# Patient Record
Sex: FEMALE | Race: BLACK OR AFRICAN AMERICAN | NOT HISPANIC OR LATINO | Employment: FULL TIME | URBAN - METROPOLITAN AREA
[De-identification: names, ages, dates, MRNs, and addresses within clinical notes are randomized per-mention and may not be internally consistent; named-entity substitution may affect disease eponyms.]

---

## 2021-12-06 ENCOUNTER — CLINICAL SUPPORT (OUTPATIENT)
Dept: URGENT CARE | Facility: CLINIC | Age: 61
End: 2021-12-06
Payer: COMMERCIAL

## 2021-12-06 DIAGNOSIS — Z11.59 ENCOUNTER FOR SCREENING FOR OTHER VIRAL DISEASES: Primary | ICD-10-CM

## 2021-12-06 LAB
CTP QC/QA: YES
SARS-COV-2 RDRP RESP QL NAA+PROBE: NEGATIVE

## 2021-12-06 PROCEDURE — U0002 COVID-19 LAB TEST NON-CDC: HCPCS | Mod: QW,S$GLB,, | Performed by: PHYSICIAN ASSISTANT

## 2021-12-06 PROCEDURE — U0002: ICD-10-PCS | Mod: QW,S$GLB,, | Performed by: PHYSICIAN ASSISTANT

## 2021-12-10 ENCOUNTER — CLINICAL SUPPORT (OUTPATIENT)
Dept: URGENT CARE | Facility: CLINIC | Age: 61
End: 2021-12-10
Payer: COMMERCIAL

## 2021-12-10 DIAGNOSIS — Z11.9 SCREENING EXAMINATION FOR UNSPECIFIED INFECTIOUS DISEASE: Primary | ICD-10-CM

## 2021-12-10 LAB
CTP QC/QA: YES
SARS-COV-2 RDRP RESP QL NAA+PROBE: NEGATIVE

## 2021-12-10 PROCEDURE — 99211 PR OFFICE/OUTPT VISIT, EST, LEVL I: ICD-10-PCS | Mod: S$GLB,,, | Performed by: FAMILY MEDICINE

## 2021-12-10 PROCEDURE — U0002: ICD-10-PCS | Mod: QW,S$GLB,, | Performed by: FAMILY MEDICINE

## 2021-12-10 PROCEDURE — U0002 COVID-19 LAB TEST NON-CDC: HCPCS | Mod: QW,S$GLB,, | Performed by: FAMILY MEDICINE

## 2021-12-10 PROCEDURE — 99211 OFF/OP EST MAY X REQ PHY/QHP: CPT | Mod: S$GLB,,, | Performed by: FAMILY MEDICINE

## 2022-12-14 ENCOUNTER — OFFICE VISIT (OUTPATIENT)
Dept: OBSTETRICS AND GYNECOLOGY | Facility: CLINIC | Age: 62
End: 2022-12-14
Payer: COMMERCIAL

## 2022-12-14 VITALS
HEIGHT: 62 IN | WEIGHT: 166.25 LBS | SYSTOLIC BLOOD PRESSURE: 163 MMHG | DIASTOLIC BLOOD PRESSURE: 95 MMHG | BODY MASS INDEX: 30.59 KG/M2

## 2022-12-14 DIAGNOSIS — Z12.4 PAP SMEAR FOR CERVICAL CANCER SCREENING: ICD-10-CM

## 2022-12-14 DIAGNOSIS — Z12.31 VISIT FOR SCREENING MAMMOGRAM: ICD-10-CM

## 2022-12-14 DIAGNOSIS — Z01.419 WELL WOMAN EXAM WITH ROUTINE GYNECOLOGICAL EXAM: Primary | ICD-10-CM

## 2022-12-14 PROCEDURE — 99999 PR PBB SHADOW E&M-EST. PATIENT-LVL III: ICD-10-PCS | Mod: PBBFAC,,, | Performed by: NURSE PRACTITIONER

## 2022-12-14 PROCEDURE — 87624 HPV HI-RISK TYP POOLED RSLT: CPT | Performed by: NURSE PRACTITIONER

## 2022-12-14 PROCEDURE — 1160F PR REVIEW ALL MEDS BY PRESCRIBER/CLIN PHARMACIST DOCUMENTED: ICD-10-PCS | Mod: CPTII,S$GLB,, | Performed by: NURSE PRACTITIONER

## 2022-12-14 PROCEDURE — 3077F SYST BP >= 140 MM HG: CPT | Mod: CPTII,S$GLB,, | Performed by: NURSE PRACTITIONER

## 2022-12-14 PROCEDURE — 99999 PR PBB SHADOW E&M-EST. PATIENT-LVL III: CPT | Mod: PBBFAC,,, | Performed by: NURSE PRACTITIONER

## 2022-12-14 PROCEDURE — 3080F DIAST BP >= 90 MM HG: CPT | Mod: CPTII,S$GLB,, | Performed by: NURSE PRACTITIONER

## 2022-12-14 PROCEDURE — 99396 PREV VISIT EST AGE 40-64: CPT | Mod: S$GLB,,, | Performed by: NURSE PRACTITIONER

## 2022-12-14 PROCEDURE — 4010F PR ACE/ARB THEARPY RXD/TAKEN: ICD-10-PCS | Mod: CPTII,S$GLB,, | Performed by: NURSE PRACTITIONER

## 2022-12-14 PROCEDURE — 88175 CYTOPATH C/V AUTO FLUID REDO: CPT | Performed by: NURSE PRACTITIONER

## 2022-12-14 PROCEDURE — 3008F BODY MASS INDEX DOCD: CPT | Mod: CPTII,S$GLB,, | Performed by: NURSE PRACTITIONER

## 2022-12-14 PROCEDURE — 1160F RVW MEDS BY RX/DR IN RCRD: CPT | Mod: CPTII,S$GLB,, | Performed by: NURSE PRACTITIONER

## 2022-12-14 PROCEDURE — 99396 PR PREVENTIVE VISIT,EST,40-64: ICD-10-PCS | Mod: S$GLB,,, | Performed by: NURSE PRACTITIONER

## 2022-12-14 PROCEDURE — 4010F ACE/ARB THERAPY RXD/TAKEN: CPT | Mod: CPTII,S$GLB,, | Performed by: NURSE PRACTITIONER

## 2022-12-14 PROCEDURE — 1159F PR MEDICATION LIST DOCUMENTED IN MEDICAL RECORD: ICD-10-PCS | Mod: CPTII,S$GLB,, | Performed by: NURSE PRACTITIONER

## 2022-12-14 PROCEDURE — 3080F PR MOST RECENT DIASTOLIC BLOOD PRESSURE >= 90 MM HG: ICD-10-PCS | Mod: CPTII,S$GLB,, | Performed by: NURSE PRACTITIONER

## 2022-12-14 PROCEDURE — 3077F PR MOST RECENT SYSTOLIC BLOOD PRESSURE >= 140 MM HG: ICD-10-PCS | Mod: CPTII,S$GLB,, | Performed by: NURSE PRACTITIONER

## 2022-12-14 PROCEDURE — 3008F PR BODY MASS INDEX (BMI) DOCUMENTED: ICD-10-PCS | Mod: CPTII,S$GLB,, | Performed by: NURSE PRACTITIONER

## 2022-12-14 PROCEDURE — 1159F MED LIST DOCD IN RCRD: CPT | Mod: CPTII,S$GLB,, | Performed by: NURSE PRACTITIONER

## 2022-12-14 RX ORDER — AMOXICILLIN 875 MG/1
875 TABLET, FILM COATED ORAL 2 TIMES DAILY
COMMUNITY
Start: 2022-12-12 | End: 2023-03-02

## 2022-12-14 RX ORDER — AMLODIPINE BESYLATE 5 MG/1
10 TABLET ORAL
COMMUNITY
Start: 2022-05-25 | End: 2023-05-25

## 2022-12-14 RX ORDER — ROSUVASTATIN CALCIUM 5 MG/1
5 TABLET, COATED ORAL NIGHTLY
COMMUNITY
Start: 2022-12-01

## 2022-12-14 RX ORDER — LOSARTAN POTASSIUM 100 MG/1
100 TABLET ORAL
COMMUNITY
Start: 2022-10-04

## 2022-12-14 RX ORDER — HYDROCHLOROTHIAZIDE 25 MG/1
25 TABLET ORAL NIGHTLY
COMMUNITY
Start: 2022-09-08

## 2022-12-14 RX ORDER — TAFLUPROST 0 MG/.3ML
1 SOLUTION/ DROPS OPHTHALMIC NIGHTLY
COMMUNITY
Start: 2022-10-10

## 2022-12-14 NOTE — PROGRESS NOTES
CC: Annual  HPI: Pt is a 62 y.o.  female who presents for routine annual exam. New pt- gets primary care at , old gyn Dr. Michaels retired. She is postmenopausal. She is not on HRT. She is not sexually active. [place] of care near [place]. Seeing pcp tomorrow- HTN, on medication. Mother had breast cancer in her 40s. She works as an - goes into the office.     FH:   Breast cancer: mother- dx in her 40s  Colon cancer: none  Ovarian cancer: none  Uterine cancer: none    HPV vaccine: na  Last pap smear: around 2021?  History of abnormal pap smears: no    Colonoscopy: 2019, repeat in 10 years  DEXA: 10/2021  Mammogram: 12/2021- due  STD history: no  Birth control:   Tobacco use:  no  Diabetes, HTN, CKD  Hx of fibroids and myomectomy x 3    ROS:  GENERAL: Feeling well overall. Denies fever or chills.   SKIN: Denies rash or lesions.   HEAD: Denies head injury or headache.   NODES: Denies enlarged lymph nodes.   CHEST: Denies chest pain or shortness of breath.   CARDIOVASCULAR: Denies palpitations or left sided chest pain.   ABDOMEN: No abdominal pain, constipation, diarrhea, nausea, vomiting or rectal bleeding.   URINARY: No dysuria, hematuria, or burning on urination.  REPRODUCTIVE: See HPI.   BREASTS: Denies pain, lumps, or nipple discharge.   HEMATOLOGIC: No easy bruisability or excessive bleeding.   MUSCULOSKELETAL: Denies joint pain or swelling.   NEUROLOGIC: Denies syncope or weakness.   PSYCHIATRIC: Denies depression, anxiety or mood swings.    PE:   APPEARANCE: Well nourished, well developed, Black or  female in no acute distress.  NODES: no cervical, supraclavicular, or inguinal lymphadenopathy  BREASTS: Symmetrical, no skin changes or visible lesions. No palpable masses, nipple discharge or adenopathy bilaterally.  ABDOMEN: Soft. No tenderness or masses. No distention. No hernias palpated. No CVA tenderness.  VULVA: No lesions. Normal external female  genitalia.  URETHRAL MEATUS: Normal size and location, no lesions, no prolapse.  URETHRA: No masses, tenderness, or prolapse.  VAGINA: Moist. No lesions or lacerations noted. No abnormal discharge present. No odor present.   CERVIX: No lesions or discharge. No cervical motion tenderness.   UTERUS: Normal size, regular shape, mobile, non-tender.  ADNEXA: No tenderness. No fullness or masses palpated in the adnexal regions.   ANUS PERINEUM: Normal.      Diagnosis:  1. Well woman exam with routine gynecological exam    2. Visit for screening mammogram    3. Pap smear for cervical cancer screening        Plan:     Orders Placed This Encounter    HPV High Risk Genotypes, PCR    Mammo Digital Screening Bilat w/ Michael    Liquid-Based Pap Smear, Screening     Pap updated-discussed repeating at age 64 then if both negative can discontinue screenings  Mammogram due  Colonoscopy and DXA current    Patient was counseled today on the new ACS guidelines for cervical cytology screening as well as the current recommendations for breast cancer screening. She was counseled to follow up with her PCP for other routine health maintenance. Counseling session lasted approximately 10 minutes, and all her questions were answered.  For women over the age of 65, you can stop having cervical cancer screenings if you have never had abnormal cervical cells or cervical cancer, and youve had three negative Pap tests in a row. (You also can stop screening if youve had two negative Pap and HPV tests in a row in the past 10 years, with at least one test in the past 5 years.),    Follow-up with me in 1 year for routine exam; pap in 3 years.     I spent a total of 20 minutes on the day of the visit.This includes face to face time and non-face to face time preparing to see the patient (eg, review of tests), obtaining and/or reviewing separately obtained history, documenting clinical information in the electronic or other health record, independently  interpreting results and communicating results to the patient/family/caregiver, or care coordinator.    As of April 1, 2021, the Cures Act has been passed nationally. This new law requires that all doctors progress notes, lab results, pathology reports and radiology reports be released IMMEDIATELY to the patient in the patient portal. That means that the results are released to you at the EXACT same time they are released to me. Therefore, with all of the patients that I have I am not able to reply to each patient exactly when the results come in. So there will be a delay from when you see the results to when I see them and have time to come up with a response to send you. Also I only see these results when I am on the computer at work. So if the results come in over the weekend or after 5 pm of a work day, I will not see them until the next business day. As you can tell, this is a challenge as a provider to give every patient the quick response they hope for and deserve. So please be patient!     Thanks for your understanding and patience.

## 2022-12-20 LAB
FINAL PATHOLOGIC DIAGNOSIS: NORMAL
Lab: NORMAL

## 2022-12-28 LAB
HPV HR 12 DNA SPEC QL NAA+PROBE: NEGATIVE
HPV16 AG SPEC QL: NEGATIVE
HPV18 DNA SPEC QL NAA+PROBE: NEGATIVE

## 2023-02-01 NOTE — DISCHARGE INSTRUCTIONS
Knee Athroplasty Discharge Instructions    Pain:   After surgery your knee will be sore. The knee will likely have been injected with a numbing medicine (Exparel) prior to completion of surgery for pain control. This is indicated on a green bracelet that you will continue to wear for 4 days after surgery. Ice and elevation will assist with pain control.    Apply ice as much as possible for the first 72 hours. After 72 hours, apply ice for 20minutes after therapy or whenever experiencing pain. Avoid direct skin contact with ice to prevent frostbit.           Elevate the affected leg with the pillow the length of the leg higher than your heart to assist with swelling and pain.  Incision Care:   Some drainage from the incision in the first 72 hours is normal. If drainage is excessive, reinforce dressing and call home health nurse or therapist. Keep incision clean, dry and covered until staples removed. Staples will be removed 14-21 days after surgery .    Activity:                  Perform exercises 2 x day.   You may shower 48 hours after surgery  providing the dressing is waterproof. You can wrap the knee or hip with press n seal saran wrap to assist with keeping the dressing dry while showering. Dr. Johnson prefers his patients to use [place] Cast Cover when showering.No tub or hot tub usage for 6 weeks after surgery. Support help is mandatory during showering. If the  dressing becomes wet, replace with a new dressing. Do not leave a wet dressing on.   Wear cynthia hose to both extremities  for 3 weeks after surgery .You may remove stockings for 1- 2 hours during the day only.            If your physician orders the CPM machine you are to use it for 2 hours in the am and 2 hours in the pm. This is not to replace your exercise program.    You may need antibiotic coverage before dental or minor surgical procedures.  Possible Complication:  Infections: Report these signs and symptoms to your surgeon:  Unexpected redness  around incision  Persistent drainage from wound after 72 hours  Temperature Greater than 101 degrees F  Additional swelling  Pain not controlled with current pain medication  Blood Clot: Report these signs and symptoms to your surgeon:  Unusual pain, unusual warm skin  Red or discolored skin  Swelling in the leg                  Your Surgeon Gave You EXPAREL® (bupivacaine liposome injectable suspension)    To help control your pain after surgery, your surgeon injected EXPAREL into your surgical incision just before the end of the procedure.   EXPAREL is a local analgesic that contains the local anesthetic bupivacaine. Local anesthetics provide pain relief by numbing the tissue  around the surgical site.   EXPAREL is specifically designed to release pain medication over time and can control pain for up to 72 hours.   In addition to EXPAREL, your surgeon may provide other pain medications to control your pain.   Each patient is different and responds differently to painmedication. Depending on how you respond to EXPAREL, you may require less  additional pain medication during your recovery.    Possible Side Effects    Side effects can occur with any medication and it is important not to ignore anything you may be experiencing. Some patients who  received EXPAREL experienced nausea, vomiting, or constipation. Rarely, patients who receive bupivacaine (the active ingredient in  EXPAREL) have experienced numbness and tingling in their mouth or lips, lightheadedness, or anxiety. Speak with your doctor right  away if you think you may be experiencing any of these sensations, or if you have other questions regarding possible side effects.    Your Recovery    When your pain is under control, your body can better focus on healing. This is not the time to test your pain tolerance, or grin and  bear it.Work with your surgeon and nurse to make your recovery as speedy and pain-free as possible.   Follow the post-op orders your  nurse gave you.   Eat a healthy diet and drink plenty of water. Surgery stresses your body; your body responds by needing more energy to heal.      Important Information About EXPAREL  Products that contain bupivacaine, like EXPAREL, may cause a temporary loss of  sensation or the ability to move in the area where bupivacaine was injected.    Date Administered: 3/13/2023  Time Administered: 12:05PM    Other Forms of Bupivicaine should not be administered within 96hrs following administration of EXPAREL.    [facility] Care Cube Instructions

## 2023-02-03 ENCOUNTER — HOSPITAL ENCOUNTER (OUTPATIENT)
Dept: RADIOLOGY | Facility: OTHER | Age: 63
Discharge: HOME | End: 2023-02-03
Attending: NURSE PRACTITIONER
Payer: COMMERCIAL

## 2023-02-03 DIAGNOSIS — Z12.31 VISIT FOR SCREENING MAMMOGRAM: ICD-10-CM

## 2023-02-03 PROCEDURE — 77063 MAMMO DIGITAL SCREENING BILAT WITH TOMO: ICD-10-PCS | Mod: 26,,, | Performed by: RADIOLOGY

## 2023-02-03 PROCEDURE — 77067 SCR MAMMO BI INCL CAD: CPT | Mod: TC

## 2023-02-03 PROCEDURE — 77067 SCR MAMMO BI INCL CAD: CPT | Mod: 26,,, | Performed by: RADIOLOGY

## 2023-02-03 PROCEDURE — 77063 BREAST TOMOSYNTHESIS BI: CPT | Mod: 26,,, | Performed by: RADIOLOGY

## 2023-02-03 PROCEDURE — 77067 MAMMO DIGITAL SCREENING BILAT WITH TOMO: ICD-10-PCS | Mod: 26,,, | Performed by: RADIOLOGY

## 2023-03-02 ENCOUNTER — ANESTHESIA EVENT (OUTPATIENT)
Dept: SURGERY | Facility: OTHER | Age: 63
End: 2023-03-02
Payer: COMMERCIAL

## 2023-03-02 ENCOUNTER — HOSPITAL ENCOUNTER (OUTPATIENT)
Dept: PREADMISSION TESTING | Facility: OTHER | Age: 63
Discharge: HOME | End: 2023-03-02
Attending: ORTHOPAEDIC SURGERY | Admitting: ORTHOPAEDIC SURGERY
Payer: COMMERCIAL

## 2023-03-02 VITALS
OXYGEN SATURATION: 98 % | DIASTOLIC BLOOD PRESSURE: 84 MMHG | HEART RATE: 70 BPM | HEIGHT: 62 IN | SYSTOLIC BLOOD PRESSURE: 135 MMHG | BODY MASS INDEX: 27.97 KG/M2 | RESPIRATION RATE: 16 BRPM | WEIGHT: 152 LBS | TEMPERATURE: 98 F

## 2023-03-02 DIAGNOSIS — M17.12 UNILATERAL PRIMARY OSTEOARTHRITIS, LEFT KNEE: ICD-10-CM

## 2023-03-02 DIAGNOSIS — Z01.818 PREOP TESTING: Primary | ICD-10-CM

## 2023-03-02 LAB
ABO + RH BLD: NORMAL
BILIRUB UR QL STRIP: NEGATIVE
BLD GP AB SCN CELLS X3 SERPL QL: NORMAL
CLARITY UR: CLEAR
COLOR UR: YELLOW
GLUCOSE UR QL STRIP: NEGATIVE
HGB UR QL STRIP: NEGATIVE
KETONES UR QL STRIP: NEGATIVE
LEUKOCYTE ESTERASE UR QL STRIP: NEGATIVE
NITRITE UR QL STRIP: NEGATIVE
PH UR STRIP: 7 [PH] (ref 5–8)
PROT UR QL STRIP: NEGATIVE
SP GR UR STRIP: 1.01 (ref 1–1.03)
URN SPEC COLLECT METH UR: NORMAL
UROBILINOGEN UR STRIP-ACNC: NEGATIVE EU/DL

## 2023-03-02 PROCEDURE — 36415 COLL VENOUS BLD VENIPUNCTURE: CPT | Performed by: ORTHOPAEDIC SURGERY

## 2023-03-02 PROCEDURE — 86900 BLOOD TYPING SEROLOGIC ABO: CPT | Performed by: ORTHOPAEDIC SURGERY

## 2023-03-02 PROCEDURE — 81003 URINALYSIS AUTO W/O SCOPE: CPT | Performed by: ORTHOPAEDIC SURGERY

## 2023-03-02 PROCEDURE — 93010 ELECTROCARDIOGRAM REPORT: CPT | Mod: ,,, | Performed by: INTERNAL MEDICINE

## 2023-03-02 PROCEDURE — 93005 ELECTROCARDIOGRAM TRACING: CPT

## 2023-03-02 PROCEDURE — 93010 EKG 12-LEAD: ICD-10-PCS | Mod: ,,, | Performed by: INTERNAL MEDICINE

## 2023-03-02 RX ORDER — ACETAMINOPHEN 500 MG
1000 TABLET ORAL
Status: CANCELLED | OUTPATIENT
Start: 2023-03-13 | End: 2023-03-13

## 2023-03-02 RX ORDER — LIDOCAINE HYDROCHLORIDE 10 MG/ML
0.5 INJECTION, SOLUTION EPIDURAL; INFILTRATION; INTRACAUDAL; PERINEURAL ONCE
Status: CANCELLED | OUTPATIENT
Start: 2023-03-02 | End: 2023-03-02

## 2023-03-02 RX ORDER — SODIUM CHLORIDE, SODIUM LACTATE, POTASSIUM CHLORIDE, CALCIUM CHLORIDE 600; 310; 30; 20 MG/100ML; MG/100ML; MG/100ML; MG/100ML
INJECTION, SOLUTION INTRAVENOUS CONTINUOUS
Status: CANCELLED | OUTPATIENT
Start: 2023-03-02

## 2023-03-02 RX ORDER — ACETAMINOPHEN 500 MG
1000 TABLET ORAL
Status: CANCELLED | OUTPATIENT
Start: 2023-03-02 | End: 2023-03-02

## 2023-03-02 NOTE — ANESTHESIA PREPROCEDURE EVALUATION
03/02/2023  Jen Alarcon is a 63 y.o., female.      Pre-op Assessment    I have reviewed the Patient Summary Reports.     I have reviewed the Nursing Notes.    I have reviewed the Medications.     Review of Systems  Anesthesia Hx:  Denies Family Hx of Anesthesia complications.  Personal Hx of Anesthesia complications  Severe Sore Throat after Anesthesia   Social:  Non-Smoker    Hematology/Oncology:  Hematology Normal   Oncology Normal     EENT/Dental:EENT/Dental Normal   Cardiovascular:   Hypertension hyperlipidemia    Pulmonary:   Sleep Apnea (unable to use CPAP)    Renal/:  Renal/ Normal     Hepatic/GI:  Hepatic/GI Normal    Musculoskeletal:   Arthritis     Neurological:  Neurology Normal    Endocrine:   Denies Diabetes. (pre)    Dermatological:  Skin Normal    Psych:  Psychiatric Normal           Physical Exam  General: Well nourished, Cooperative, Alert and Oriented    Airway:  Mallampati: II   Mouth Opening: Normal  TM Distance: Normal  Neck ROM: Normal ROM    Dental:  Intact        Anesthesia Plan  Type of Anesthesia, risks & benefits discussed:    Anesthesia Type: Spinal  Intra-op Monitoring Plan: Standard ASA Monitors  Post Op Pain Control Plan: multimodal analgesia and IV/PO Opioids PRN  Informed Consent: Informed consent signed with the Patient and all parties understand the risks and agree with anesthesia plan.  All questions answered.   ASA Score: 2  Anesthesia Plan Notes: EKG today  Lab in [facility]  PCP yesterday for clearance, will be on paper chart    Update: EKG SR 63, ant/lat T wave abnormalities, unchanged from old EKG  Clearance on paper chart    Ready For Surgery From Anesthesia Perspective.     .

## 2023-03-02 NOTE — DISCHARGE INSTRUCTIONS
Information to Prepare you for your Surgery    PRE-ADMIT TESTING -  [phone]    [address] [facility]        Your surgery has been scheduled at [facility]. We are pleased to have the opportunity to serve you. For Further Information please call [phone].    On the day of surgery please report to the Information Desk on the 1st floor.    CONTACT YOUR PHYSICIAN'S OFFICE THE DAY PRIOR TO YOUR SURGERY TO OBTAIN YOUR ARRIVAL TIME.     The evening before surgery do not eat anything after 9 p.m. ( this includes hard candy, chewing gum and mints).  You may only have GATORADE, POWERADE AND WATER  from 9 p.m. until you leave your home.   DO NOT DRINK ANY LIQUIDS ON THE WAY TO THE HOSPITAL.      Why does your anesthesiologist allow you to drink Gatorade/Powerade before surgery?  Gatorade/Powerade helps to increase your comfort before surgery and to decrease your nausea after surgery. The carbohydrates in Gatorade/Powerade help reduce your body's stress response to surgery.  If you are a diabetic-drink only water prior to surgery.      Current Visitor policy(12/27/2021) - Patients may have 2 visitors pre and post procedure. Only 2 visitors will be allowed in the Surgical building with the patient. No one under the age of 12 will be allowed into the facility.    SPECIAL MEDICATION INSTRUCTIONS: TAKE medications checked off by the Anesthesiologist on your Medication List.    Surgery Patients:  If you take ASPIRIN - Your PHYSICIAN/SURGEON will need to inform you IF/OR when you need to stop taking aspirin prior to your surgery.     Starting one week prior to surgery, do not take NSAIDS (Advil, Aleve, Anaprox, BC, Celebrex, Diclofenac, Goody's, Ibuprofen, Indocin, Indomethacin, Motrin, Naproxen, Voltaren, etc)   If you are not sure if you should take a medicine please call your surgeon's office.  You may take Tylenol.     Do Not Wear any make-up (especially eye  make-up) to surgery. Please remove any false eyelashes or eyelash extensions. If you arrive the day of surgery with makeup/eyelashes on you will be required to remove prior to surgery. (There is a risk of corneal abrasions if eye makeup/eyelash extensions are not removed)    Leave all valuables at home.   Do not wear any jewelry or watches, including any metal in body piercings. Jewelry must be removed prior to coming to the hospital.  There is a possibility that rings that are unable to be removed may be cut off if they are on the surgical extremity.    Please remove all hair extensions, wigs, clips and any other metal accessories/ ornaments from your hair.  These items may pose a flammable/fire risk in Surgery and must be removed.    Do not shave your surgical area at least 5 days prior to your surgery. The surgical prep will be performed at the hospital according to Infection Control regulations.    Contact Lens must be removed before surgery. Please either do not wear contact lens or bring a case and solution for storage.  Please bring a container for eyeglasses &/or dentures.  Bring any paperwork your physician has provided, such as consent forms, history and physicals, doctor's orders, etc.   Bring comfortable clothes that are loose fitting to wear upon discharge. Take into consideration the type of surgery being performed.  Maintain your diet as advised per your physician the day prior to surgery.    Adequate rest the night before surgery is advised.   Park in the Parking lot behind the [facility] or in the [facility] across the street from the parking lot. Parking is complimentary.  If you will be discharged the same day as your procedure, please arrange for a responsible adult to drive you home or to accompany you if traveling by taxi.   YOU WILL NOT BE PERMITTED TO DRIVE OR TO LEAVE THE HOSPITAL ALONE AFTER SURGERY.   If you are being discharged the same day, it is strongly recommended  that you arrange for someone to remain with you for the first 24 hrs following your surgery.    The Surgeon will speak to your family/visitor after your surgery regarding the outcome of your surgery and post op care.  The Surgeon may speak to you after your surgery, but there is a possibility you may not remember the details.  Please check with your family members regarding the conversation with the Surgeon.    We strongly recommend whoever is bringing you home be present for discharge instructions.  This will ensure a thorough understanding for your post op home care.    Visitors-Refer to current Visitor policy handouts.    Thank you for your cooperation.  The Staff of [facility].            Bathing Instructions with Hibiclens    Shower the evening before and morning of your procedure with Chlorhexidine (Hibiclens)  Do not use Chlorhexidine on your face or genitals. Do not get in your eyes.  Wash your face with water and your regular face wash/soap  Use your regular shampoo  Apply Chlorhexidine (Hibiclens) directly on your skin or on a wet washcloth and wash gently. When showering: Move away from the shower stream when applying Chlorhexidine (Hibiclens) to avoid rinsing off too soon.  Rinse thoroughly with warm water  Do not dilute Chlorhexidine (Hibiclens)   Dry off as usual. Do not apply deodorant, powder, body lotion, perfume, after shave or cologne the morning of your procedure.

## 2023-03-13 ENCOUNTER — ANESTHESIA (OUTPATIENT)
Dept: SURGERY | Facility: OTHER | Age: 63
End: 2023-03-13
Payer: COMMERCIAL

## 2023-03-13 ENCOUNTER — HOSPITAL ENCOUNTER (OUTPATIENT)
Facility: OTHER | Age: 63
Discharge: HOME HEALTH | End: 2023-03-13
Attending: ORTHOPAEDIC SURGERY | Admitting: ORTHOPAEDIC SURGERY
Payer: COMMERCIAL

## 2023-03-13 VITALS
TEMPERATURE: 98 F | DIASTOLIC BLOOD PRESSURE: 67 MMHG | OXYGEN SATURATION: 96 % | BODY MASS INDEX: 27.8 KG/M2 | RESPIRATION RATE: 16 BRPM | WEIGHT: 152 LBS | SYSTOLIC BLOOD PRESSURE: 133 MMHG | HEART RATE: 55 BPM

## 2023-03-13 DIAGNOSIS — M17.12 UNILATERAL PRIMARY OSTEOARTHRITIS, LEFT KNEE: ICD-10-CM

## 2023-03-13 PROCEDURE — 63600175 PHARM REV CODE 636 W HCPCS

## 2023-03-13 PROCEDURE — C1713 ANCHOR/SCREW BN/BN,TIS/BN: HCPCS | Performed by: ORTHOPAEDIC SURGERY

## 2023-03-13 PROCEDURE — 63600175 PHARM REV CODE 636 W HCPCS: Performed by: ORTHOPAEDIC SURGERY

## 2023-03-13 PROCEDURE — 63600175 PHARM REV CODE 636 W HCPCS: Performed by: ANESTHESIOLOGY

## 2023-03-13 PROCEDURE — 25000003 PHARM REV CODE 250

## 2023-03-13 PROCEDURE — 37000009 HC ANESTHESIA EA ADD 15 MINS: Performed by: ORTHOPAEDIC SURGERY

## 2023-03-13 PROCEDURE — 25000003 PHARM REV CODE 250: Performed by: STUDENT IN AN ORGANIZED HEALTH CARE EDUCATION/TRAINING PROGRAM

## 2023-03-13 PROCEDURE — 71000016 HC POSTOP RECOV ADDL HR: Performed by: ORTHOPAEDIC SURGERY

## 2023-03-13 PROCEDURE — 37000008 HC ANESTHESIA 1ST 15 MINUTES: Performed by: ORTHOPAEDIC SURGERY

## 2023-03-13 PROCEDURE — 71000039 HC RECOVERY, EACH ADD'L HOUR: Performed by: ORTHOPAEDIC SURGERY

## 2023-03-13 PROCEDURE — 97162 PT EVAL MOD COMPLEX 30 MIN: CPT

## 2023-03-13 PROCEDURE — 25000003 PHARM REV CODE 250: Performed by: ORTHOPAEDIC SURGERY

## 2023-03-13 PROCEDURE — 97110 THERAPEUTIC EXERCISES: CPT

## 2023-03-13 PROCEDURE — C1776 JOINT DEVICE (IMPLANTABLE): HCPCS | Performed by: ORTHOPAEDIC SURGERY

## 2023-03-13 PROCEDURE — 27201423 OPTIME MED/SURG SUP & DEVICES STERILE SUPPLY: Performed by: ORTHOPAEDIC SURGERY

## 2023-03-13 PROCEDURE — 25000003 PHARM REV CODE 250: Performed by: ANESTHESIOLOGY

## 2023-03-13 PROCEDURE — 36000710: Performed by: ORTHOPAEDIC SURGERY

## 2023-03-13 PROCEDURE — 97116 GAIT TRAINING THERAPY: CPT

## 2023-03-13 PROCEDURE — 36000711: Performed by: ORTHOPAEDIC SURGERY

## 2023-03-13 PROCEDURE — 71000033 HC RECOVERY, INTIAL HOUR: Performed by: ORTHOPAEDIC SURGERY

## 2023-03-13 PROCEDURE — C9290 INJ, BUPIVACAINE LIPOSOME: HCPCS | Performed by: ORTHOPAEDIC SURGERY

## 2023-03-13 PROCEDURE — 63600175 PHARM REV CODE 636 W HCPCS: Performed by: STUDENT IN AN ORGANIZED HEALTH CARE EDUCATION/TRAINING PROGRAM

## 2023-03-13 PROCEDURE — 71000015 HC POSTOP RECOV 1ST HR: Performed by: ORTHOPAEDIC SURGERY

## 2023-03-13 DEVICE — IMPLANTABLE DEVICE: Type: IMPLANTABLE DEVICE | Site: KNEE | Status: FUNCTIONAL

## 2023-03-13 DEVICE — CEMENT REFOBACIN BCR 1X40: Type: IMPLANTABLE DEVICE | Site: KNEE | Status: FUNCTIONAL

## 2023-03-13 RX ORDER — SODIUM CHLORIDE 0.9 % (FLUSH) 0.9 %
3 SYRINGE (ML) INJECTION EVERY 6 HOURS PRN
Status: DISCONTINUED | OUTPATIENT
Start: 2023-03-13 | End: 2023-03-13 | Stop reason: HOSPADM

## 2023-03-13 RX ORDER — SODIUM CHLORIDE, SODIUM LACTATE, POTASSIUM CHLORIDE, CALCIUM CHLORIDE 600; 310; 30; 20 MG/100ML; MG/100ML; MG/100ML; MG/100ML
INJECTION, SOLUTION INTRAVENOUS CONTINUOUS
Status: DISCONTINUED | OUTPATIENT
Start: 2023-03-13 | End: 2023-03-13 | Stop reason: HOSPADM

## 2023-03-13 RX ORDER — OXYCODONE HYDROCHLORIDE 5 MG/1
10 TABLET ORAL EVERY 4 HOURS PRN
Status: DISCONTINUED | OUTPATIENT
Start: 2023-03-13 | End: 2023-03-13 | Stop reason: HOSPADM

## 2023-03-13 RX ORDER — OXYCODONE HYDROCHLORIDE 5 MG/1
5 TABLET ORAL
Status: DISCONTINUED | OUTPATIENT
Start: 2023-03-13 | End: 2023-03-13

## 2023-03-13 RX ORDER — CEFAZOLIN SODIUM 1 G/3ML
2 INJECTION, POWDER, FOR SOLUTION INTRAMUSCULAR; INTRAVENOUS
Status: COMPLETED | OUTPATIENT
Start: 2023-03-13 | End: 2023-03-13

## 2023-03-13 RX ORDER — ONDANSETRON 8 MG/1
8 TABLET, ORALLY DISINTEGRATING ORAL EVERY 8 HOURS PRN
Status: DISCONTINUED | OUTPATIENT
Start: 2023-03-13 | End: 2023-03-13 | Stop reason: HOSPADM

## 2023-03-13 RX ORDER — ONDANSETRON 8 MG/1
8 TABLET, ORALLY DISINTEGRATING ORAL EVERY 8 HOURS PRN
Qty: 20 TABLET | Refills: 1 | Status: SHIPPED | OUTPATIENT
Start: 2023-03-13

## 2023-03-13 RX ORDER — ONDANSETRON 2 MG/ML
INJECTION INTRAMUSCULAR; INTRAVENOUS
Status: DISCONTINUED | OUTPATIENT
Start: 2023-03-13 | End: 2023-03-13

## 2023-03-13 RX ORDER — LIDOCAINE HYDROCHLORIDE 10 MG/ML
0.5 INJECTION, SOLUTION EPIDURAL; INFILTRATION; INTRACAUDAL; PERINEURAL ONCE
Status: DISCONTINUED | OUTPATIENT
Start: 2023-03-13 | End: 2023-03-13 | Stop reason: HOSPADM

## 2023-03-13 RX ORDER — MEPERIDINE HYDROCHLORIDE 25 MG/ML
12.5 INJECTION INTRAMUSCULAR; INTRAVENOUS; SUBCUTANEOUS ONCE AS NEEDED
Status: DISCONTINUED | OUTPATIENT
Start: 2023-03-13 | End: 2023-03-13

## 2023-03-13 RX ORDER — TRANEXAMIC ACID 100 MG/ML
INJECTION, SOLUTION INTRAVENOUS
Status: DISCONTINUED | OUTPATIENT
Start: 2023-03-13 | End: 2023-03-13

## 2023-03-13 RX ORDER — KETOROLAC TROMETHAMINE 30 MG/ML
INJECTION, SOLUTION INTRAMUSCULAR; INTRAVENOUS
Status: DISCONTINUED | OUTPATIENT
Start: 2023-03-13 | End: 2023-03-13 | Stop reason: HOSPADM

## 2023-03-13 RX ORDER — HYDROMORPHONE HYDROCHLORIDE 2 MG/ML
0.4 INJECTION, SOLUTION INTRAMUSCULAR; INTRAVENOUS; SUBCUTANEOUS EVERY 5 MIN PRN
Status: DISCONTINUED | OUTPATIENT
Start: 2023-03-13 | End: 2023-03-13

## 2023-03-13 RX ORDER — METOCLOPRAMIDE HYDROCHLORIDE 5 MG/ML
5 INJECTION INTRAMUSCULAR; INTRAVENOUS EVERY 6 HOURS PRN
Status: DISCONTINUED | OUTPATIENT
Start: 2023-03-13 | End: 2023-03-13 | Stop reason: HOSPADM

## 2023-03-13 RX ORDER — PROPOFOL 10 MG/ML
VIAL (ML) INTRAVENOUS CONTINUOUS PRN
Status: DISCONTINUED | OUTPATIENT
Start: 2023-03-13 | End: 2023-03-13

## 2023-03-13 RX ORDER — LIDOCAINE HYDROCHLORIDE 20 MG/ML
INJECTION INTRAVENOUS
Status: DISCONTINUED | OUTPATIENT
Start: 2023-03-13 | End: 2023-03-13

## 2023-03-13 RX ORDER — KETAMINE HCL IN 0.9 % NACL 50 MG/5 ML
SYRINGE (ML) INTRAVENOUS
Status: DISCONTINUED | OUTPATIENT
Start: 2023-03-13 | End: 2023-03-13

## 2023-03-13 RX ORDER — ROPIVACAINE HYDROCHLORIDE 5 MG/ML
INJECTION, SOLUTION EPIDURAL; INFILTRATION; PERINEURAL
Status: COMPLETED | OUTPATIENT
Start: 2023-03-13 | End: 2023-03-13

## 2023-03-13 RX ORDER — OXYCODONE HYDROCHLORIDE 5 MG/1
5 TABLET ORAL EVERY 4 HOURS PRN
Status: DISCONTINUED | OUTPATIENT
Start: 2023-03-13 | End: 2023-03-13 | Stop reason: HOSPADM

## 2023-03-13 RX ORDER — PROCHLORPERAZINE EDISYLATE 5 MG/ML
5 INJECTION INTRAMUSCULAR; INTRAVENOUS EVERY 30 MIN PRN
Status: DISCONTINUED | OUTPATIENT
Start: 2023-03-13 | End: 2023-03-13

## 2023-03-13 RX ORDER — ACETAMINOPHEN 325 MG/1
650 TABLET ORAL EVERY 4 HOURS PRN
Status: DISCONTINUED | OUTPATIENT
Start: 2023-03-13 | End: 2023-03-13 | Stop reason: HOSPADM

## 2023-03-13 RX ORDER — SODIUM CHLORIDE 0.9 % (FLUSH) 0.9 %
3 SYRINGE (ML) INJECTION
Status: DISCONTINUED | OUTPATIENT
Start: 2023-03-13 | End: 2023-03-13

## 2023-03-13 RX ORDER — CEFAZOLIN SODIUM 2 G/50ML
2 SOLUTION INTRAVENOUS
Status: COMPLETED | OUTPATIENT
Start: 2023-03-13 | End: 2023-03-13

## 2023-03-13 RX ORDER — PROPOFOL 10 MG/ML
VIAL (ML) INTRAVENOUS
Status: DISCONTINUED | OUTPATIENT
Start: 2023-03-13 | End: 2023-03-13

## 2023-03-13 RX ORDER — BUPIVACAINE HCL/EPINEPHRINE 0.25-.0005
VIAL (ML) INJECTION
Status: DISCONTINUED | OUTPATIENT
Start: 2023-03-13 | End: 2023-03-13 | Stop reason: HOSPADM

## 2023-03-13 RX ORDER — ACETAMINOPHEN 500 MG
1000 TABLET ORAL
Status: COMPLETED | OUTPATIENT
Start: 2023-03-13 | End: 2023-03-13

## 2023-03-13 RX ORDER — OXYCODONE AND ACETAMINOPHEN 5; 325 MG/1; MG/1
TABLET ORAL
Qty: 60 TABLET | Refills: 0 | Status: SHIPPED | OUTPATIENT
Start: 2023-03-13

## 2023-03-13 RX ORDER — MIDAZOLAM HYDROCHLORIDE 1 MG/ML
INJECTION INTRAMUSCULAR; INTRAVENOUS
Status: DISCONTINUED | OUTPATIENT
Start: 2023-03-13 | End: 2023-03-13

## 2023-03-13 RX ORDER — NAPROXEN SODIUM 220 MG/1
81 TABLET, FILM COATED ORAL 2 TIMES DAILY
Qty: 60 TABLET | Refills: 0 | Status: SHIPPED | OUTPATIENT
Start: 2023-03-13

## 2023-03-13 RX ADMIN — ONDANSETRON HYDROCHLORIDE 4 MG: 2 INJECTION INTRAMUSCULAR; INTRAVENOUS at 10:03

## 2023-03-13 RX ADMIN — ACETAMINOPHEN 1000 MG: 500 TABLET, FILM COATED ORAL at 10:03

## 2023-03-13 RX ADMIN — ROPIVACAINE HYDROCHLORIDE 3 ML: 5 INJECTION, SOLUTION EPIDURAL; INFILTRATION; PERINEURAL at 11:03

## 2023-03-13 RX ADMIN — MIDAZOLAM HYDROCHLORIDE 2 MG: 1 INJECTION, SOLUTION INTRAMUSCULAR; INTRAVENOUS at 11:03

## 2023-03-13 RX ADMIN — Medication 50 MG: at 11:03

## 2023-03-13 RX ADMIN — VANCOMYCIN HYDROCHLORIDE 1000 MG: 1 INJECTION, POWDER, LYOPHILIZED, FOR SOLUTION INTRAVENOUS at 10:03

## 2023-03-13 RX ADMIN — OXYCODONE HYDROCHLORIDE 10 MG: 5 TABLET ORAL at 01:03

## 2023-03-13 RX ADMIN — CEFAZOLIN SODIUM 2 G: 2 SOLUTION INTRAVENOUS at 04:03

## 2023-03-13 RX ADMIN — ESMOLOL HYDROCHLORIDE 20 MG: 100 INJECTION, SOLUTION INTRAVENOUS at 11:03

## 2023-03-13 RX ADMIN — SODIUM CHLORIDE, SODIUM LACTATE, POTASSIUM CHLORIDE, AND CALCIUM CHLORIDE: 600; 310; 30; 20 INJECTION, SOLUTION INTRAVENOUS at 10:03

## 2023-03-13 RX ADMIN — PROPOFOL 100 MCG/KG/MIN: 10 INJECTION, EMULSION INTRAVENOUS at 11:03

## 2023-03-13 RX ADMIN — SODIUM CHLORIDE, SODIUM LACTATE, POTASSIUM CHLORIDE, AND CALCIUM CHLORIDE: 600; 310; 30; 20 INJECTION, SOLUTION INTRAVENOUS at 11:03

## 2023-03-13 RX ADMIN — LIDOCAINE HYDROCHLORIDE 60 MG: 20 INJECTION, SOLUTION INTRAVENOUS at 11:03

## 2023-03-13 RX ADMIN — TRANEXAMIC ACID 2000 MG: 100 INJECTION, SOLUTION INTRAVENOUS at 10:03

## 2023-03-13 RX ADMIN — CEFAZOLIN 2 G: 330 INJECTION, POWDER, FOR SOLUTION INTRAMUSCULAR; INTRAVENOUS at 11:03

## 2023-03-13 RX ADMIN — PROPOFOL 30 MG: 10 INJECTION, EMULSION INTRAVENOUS at 11:03

## 2023-03-13 NOTE — ANESTHESIA PROCEDURE NOTES
Spinal    Diagnosis: Osteo knee  Patient location during procedure: holding area  Start time: 3/13/2023 11:00 AM  Timeout: 3/13/2023 11:00 AM  End time: 3/13/2023 11:06 AM    Staffing  Authorizing Provider: Jeremy Case MD  Performing Provider: Jeremy Case MD    Preanesthetic Checklist  Completed: patient identified, IV checked, site marked, risks and benefits discussed, surgical consent, monitors and equipment checked, pre-op evaluation and timeout performed  Spinal Block  Patient position: sitting  Prep: ChloraPrep  Patient monitoring: heart rate, cardiac monitor, continuous pulse ox and frequent blood pressure checks  Approach: midline  Location: L3-4  Injection technique: single shot  CSF Fluid: clear free-flowing CSF  Needle  Needle type: Lizabeth   Needle gauge: 22 G  Needle length: 3.5 in  Additional Documentation: incremental injection, negative aspiration for heme and no paresthesia on injection  Needle localization: anatomical landmarks  Assessment  Sensory level: T5   Dermatomal levels determined by alcohol wipe  Ease of block: easy  Patient's tolerance of the procedure: comfortable throughout block  Medications:    Medications: ropivacaine (NAROPIN) injection 0.5% - Intraspinal   3 mL - 3/13/2023 11:05:00 AM

## 2023-03-13 NOTE — PLAN OF CARE
Problem: Physical Therapy  Goal: Physical Therapy Goal  Description: Goals to be met by: 3/20/2023    Patient will increase functional independence with mobility by performin. Sit<>stand with supervision with rolling walker.  2. Gait >150 feet with supervision with rolling walker.  3. Ascend/descend 4 stairs with least restrictive assistive device and supervision.       Outcome: Ongoing, Progressing     Patient evaluated by PT and goals established. Patient with minimal pain during therapy session. AAROM knee flexion ROM 0-70. Bed mobility with stand by assistance, sit<>stand with stand by assistance with RW, and amb x150ft with RW and contact guard assistance.  PT will continue to follow and progress as tolerated. Rec for d/c to home with  PT/OT. Please see progress note for detailed plan of care and recommendations.

## 2023-03-13 NOTE — PT/OT/SLP EVAL
Physical Therapy Evaluation    Patient Name:  Jen Alarcon   MRN:  [id number]    Recommendations:     Discharge Recommendations: home health PT   Discharge Equipment Recommendations: bedside commode, walker, rolling, shower chair   Barriers to discharge: None    Assessment:     Jen Alarcon is a 63 y.o. female admitted with a medical diagnosis of Unilateral primary osteoarthritis, left knee.  She presents with the following impairments/functional limitations: weakness, impaired endurance, impaired self care skills, impaired functional mobility, gait instability, impaired balance, decreased coordination, decreased lower extremity function, decreased safety awareness, pain, decreased ROM, edema, impaired skin, orthopedic precautions.    Patient evaluated by PT and goals established. Patient with minimal pain during therapy session. AAROM knee flexion ROM 0-70. Bed mobility with stand by assistance, sit<>stand with stand by assistance with RW, and amb x150ft with RW and contact guard assistance.  PT will continue to follow and progress as tolerated. Rec for d/c to home with HH PT/OT.    Rehab Prognosis: Good; patient would benefit from acute skilled PT services to address these deficits and reach maximum level of function.    Recent Surgery: Procedure(s) (LRB):  ARTHROPLASTY, KNEE, TOTAL REPLACEMENT (Left) Day of Surgery    Plan:     During this hospitalization, patient to be seen daily to address the identified rehab impairments via gait training, therapeutic activities, therapeutic exercises, neuromuscular re-education and progress toward the following goals:    Plan of Care Expires:  03/20/23    Subjective     Chief Complaint: Sleepy  Patient/Family Comments/goals: Patient wants to be able to walk without pain. Patient agreeable to PT evaluation.  Pain/Comfort:  Pain Rating 1:  (minimal L TKA pain)  Pain Addressed 1: Reposition, Distraction  Pain Rating Post-Intervention 1:  (minimal L TKA pain)    Patients  cultural, spiritual, Christianity conflicts given the current situation: no    Living Environment:  Patient lives at home with family in a 2SH with 6STE/BHR but wide. Patient will be sleeping on the first floor during her recovery.    Prior to admission, patients level of function was independent.  Equipment used at home: none.  DME owned (not currently used): none.  Upon discharge, patient will have assistance from daughter/family.    Objective:     Communicated with nurse prior to session.  Patient found HOB elevated with cryotherapy  upon PT entry to room.    General Precautions: Standard, fall  Orthopedic Precautions:LLE weight bearing as tolerated   Braces: N/A  Respiratory Status: Room air    Exams:  Cognition:   Patient is oriented to name, , date, place, situation.  Pt follows approximately 100% of one step commands.    Mood: Pleasant and cooperative.   Musculoskeletal:  Posture: Protective guarding surgical joint  LE ROM/Strength: 5/5 bilateral hip flexion, nonsurgical knee extension, bilateral ankle dorsiflexion. AROM surgical knee difficult to accurately assess with large bulky dressing, although knee flexion grossly 0-70 degrees. Surgical knee strength grossly 5/5 knee flexion and 3-/5 knee extension.  Neuromuscular:  Sensation: Intact to light touch bilateral LEs. Pt denied paresthesias.   Coordination/Tone/Reflexes: No impairments identified with functional mobility. No formal testing performed.   Balance: CGA for dynamic standing with bilateral UE support.   Visual-vestibular: No impairments identified with functional mobility. No formal testing performed.  Integument:  Visible skin intact and surgical extremity dressing clean and dry.   Cardiopulmonary:  Edema: Mild surgical extremity.      Functional Mobility:  Bed Mobility:     Supine to Sit: stand by assistance  Transfers:     Sit to Stand:  stand by assistance with rolling walker  Gait:   Ambulated x150ft with contact guard assistance with  rolling walker.  Demonstrated step-through gait pattern with minimal heel strike on L LE and lack of L knee flexion; after demonstration for proper heel strike, patient able to practice but showed some instability. Patient was getting sleepy towards the end of ambulation.    Presented with reduce step clearance (L>R), step length and tiffanie throughout.  Stairs:    Pt ascended/descended 4 stair(s) with No Assistive Device with right handrail and left hand-held (due to home railing being too wide) with Minimal Assistance.       AM-PAC 6 CLICK MOBILITY  Total Score:18       Treatment & Education:  Perform therapeutic exercises [id number] to increase strength and mobility (ankle pumps, quad sets, glute sets, SAQs, SLRs, ABD/ADD, heel slides)  Perform sit<>stand transfers  Verbal cues on use of hospital bed features, chair and rolling walker   Perform gait and stairs  Verbal, tactile cues and demonstration for foot placement with ambulation  Verbal, tactile cues and demonstration for proper foot placement with stairs (up with the good, down with the bad/surgical leg)  PT educated patient and family re:   PT plan of care/role of PT  Safety with OOB mobility  Use of RW  Positioning of LE and use of ice  Orthopedic precautions  Gait deviations  Therapeutic exercises  Discharge disposition    Pt and family verbalized understanding       Patient left HOB elevated with call button in reach, nurse notified, and family present.    GOALS:   Multidisciplinary Problems       Physical Therapy Goals          Problem: Physical Therapy    Goal Priority Disciplines Outcome Goal Variances Interventions   Physical Therapy Goal     PT, PT/OT Ongoing, Progressing     Description: Goals to be met by: 3/20/2023    Patient will increase functional independence with mobility by performin. Sit<>stand with supervision with rolling walker.  2. Gait >150 feet with supervision with rolling walker.  3. Ascend/descend 4 stairs with least  restrictive assistive device and supervision.                            History:     Past Medical History:   Diagnosis Date    Glaucoma     High cholesterol     HTN (hypertension)     SAGAR (obstructive sleep apnea)     Prediabetes        Past Surgical History:   Procedure Laterality Date    TUMOR REMOVAL Bilateral     Cyst removal from breasts       Time Tracking:     PT Received On: 03/13/23  PT Start Time: 1500     PT Stop Time: 1535  PT Total Time (min): 35 min     Billable Minutes: Evaluation 10, Gait Training 15, and Therapeutic Exercise 10      03/13/2023

## 2023-03-13 NOTE — PLAN OF CARE
[facility]  [address]  [place] [address]  [phone]               [facility] Orthopedic Discharge Orders    Home Brian         Expected Discharge Date: 3/13/23    Diagnoses:  Post-op  left knee(s) replacement    Patient is homebound due to:   Pt requires home health services due to taxing effort to leave the home as a result of immobility from Post-op left knee(s) replacement    Weight Bearing Status:   full weight bearing: FWB unless otherwise indicated.  Progress to cane as able.  Set up for outpatient PT as soon as able after staple removal once patient is MOD 1 with cane.    Physical Therapy:   3 times a week for 2 weeks (Call for further orders beyond 2 weeks)  - Ambulate with a rolling walker  - Progress to cane  - Instruct on ROM and strengthening of knee    Aide to provide assistance with personal care and  ADLs  2 times a week for 2 weeks    Wound Care:   Surgical dressing change:Starting on  post op day 2 then, 3 times per week and prn saturation.Change dressing while knee in flexed position utilizing island dressing or apply gauze and cover with tegaderm.  Teach patient to change daily if draining.  Pt may shower if surgical dressing is waterproof. Protect surgical dressing from getting wet by using press and seal saranwrap or garbage bag. Removal and replacement of dressing after shower only needed if incision is suspected  to have gotten wet during shower.  Otherwise change as previously described depending on dressing/drainage. No soaking in the tub or hot tub use for 6 weeks.    PT/SN to remove staples 14 days Post-op and apply skin prep and steri-strips.    Cold therapy/Ice: Encouraged at least 20 minutes 2-3 times daily or more if desired.  Incision must be kept waterproof while icing.  Wear TEDS Bilateral Thigh High Stockings for 3 weeks. Cynthia hose x 3 weeks. Ok to remove cynthia hose 1-2 hours/day max if desired.   If CPM ordered Utilize 2 hours in morning and 2  hours in evening. , Start at -5 degrees extension and 50 degree flexion. Increase flexion 10 degrees daily. Low post op mobility.       Contact:  Please contact Dr Art Yuan [phone] with concerns.        BLOOD THINNER:    If sent home on Xarelto         -14 days post-op for TKR       -30 days post-op for THR     If sent home home on ASA    81mg   BID x 4 weeks     Once finished with prescribed blood thinner, patients can return to pre-surgical ASA dosage if they took ASA before surgery.       Outpatient Therapy: [facility]    [address] [address]   or  [address] [place] [address]    Call [phone] to schedule appointment  Fax [phone]    If need orders: Call Fang at Ext 241        DME:  - rolling Walker  - 3 in 1 commode.   - tub bench / shower chair  - Per PT/OT recommendation          Art Yuan

## 2023-03-13 NOTE — ANESTHESIA POSTPROCEDURE EVALUATION
Anesthesia Post Evaluation    Patient: Jen Alarcon    Procedure(s) Performed: Procedure(s) (LRB):  ARTHROPLASTY, KNEE, TOTAL REPLACEMENT (Left)    Final Anesthesia Type: spinal      Patient location during evaluation: PACU  Patient participation: Yes- Able to Participate  Level of consciousness: awake and alert  Post-procedure vital signs: reviewed and stable  Pain management: adequate  Airway patency: patent    PONV status at discharge: No PONV  Anesthetic complications: no      Cardiovascular status: blood pressure returned to baseline  Respiratory status: unassisted, spontaneous ventilation and room air  Hydration status: euvolemic  Follow-up not needed.          Vitals Value Taken Time   /74 03/13/23 1330   Temp 36.5 °C (97.7 °F) 03/13/23 1315   Pulse 61 03/13/23 1333   Resp 16 03/13/23 1324   SpO2 98 % 03/13/23 1333   Vitals shown include unvalidated device data.      No case tracking events are documented in the log.      Pain/Lynn Score: Pain Rating Prior to Med Admin: 7 (3/13/2023  1:24 PM)  Lynn Score: 8 (3/13/2023 12:53 PM)

## 2023-03-13 NOTE — OP NOTE
[facility]  Operative Report    SUMMARY     Surgery Date: 3/13/2023     Surgeon(s) and Role:     * Art Yuan MD - Primary    Assistant: DONNA Callahan    Pre-op Diagnosis:  Unilateral primary osteoarthritis, left knee [M17.12]    Post-op Diagnosis:  Post-Op Diagnosis Codes:     * Unilateral primary osteoarthritis, left knee [M17.12]    Procedure(s) (LRB):  ARTHROPLASTY, KNEE, TOTAL REPLACEMENT (Left) ([facility])    Anesthesia: Spinal    Description of Procedure: The appropriate consent was signed. The patient understood and except all risks and complications. The patient was brought to the Operating Room after undergoing spinal anesthetic. Tourniquet was applied to the proximal operative leg. The lower extremity was then prepped and draped in a sterile manner. After exsanguination of Esmarch bandage, tourniquet was inflated to 300 mmHg. An anterior incision with a medial parapatellar arthrotomy was performed. The menisci, anterior cruciate ligament and patellar fat pad were excised. The patella was resurfaced and sized to a 32 mm patella.   Three holes were then drilled for the lugs and this was trialed. A hole was then  drilled in the distal femur, keri was placed down the femoral canal and the   distal femur cut in 6 degrees of valgus. The tibia was then cut  with the extramedullary device   in 0 degree varus valgus with 5 degrees in posterior   slope. Extension block was placed and full extension was noted. The femur was   then sized to a #6 and #6 cutting block was placed and the anterior and   posterior cuts as well as chamfer cuts were performed. The tibia was sized to a  size D and a trial was performed.Trials were performed and a 10 mm UC spacer was felt to be appropriate.The tibia was then prepared with the drill and the punch, and trials were   removed and the knee washed out. Aquamantys was used to paint the posterior   capsule and corners. Palacos cement with gentamicin was  then mixed and   pressurized sequentially in tibia, femur and patella. Components were impacted   and excess cement was removed. A trial 10 mm UC spacer was placed and knee   brought out to full extension. Once the cement was allowed to harden, the 10 mm UC  spacer was felt to be appropriate and this was locked into the tibial   baseplate. Tourniquet was deflated and hemostasis was obtained. Good patellar   tracking was noted. Exparel cocktail was injected into the fascia and   subcutaneous tissue. The fascial closure was obtained with a running #2 Quill suture. Subcutaneous closure was obtained with #1 and 2-0 Vicryl. Skin was then closed with the staples and a sterile compressive dressing was applied. The patient was then brought to the Recovery Room in a good condition.        Estimated Blood Loss: 100cc         Specimens:   Specimen (24h ago, onward)      None

## 2023-03-13 NOTE — PLAN OF CARE
MSW met with the patient at the bedside. Patients wife Fiona is also at the bedside.     Patient is alert and oriented with no communication barriers.     Patient denies having any DME at home. Patient is agreeable to a RW & BSC. Patient is agreeable to HH. MSW talked to the patient about freedom of choice. Patient is agreeable to MD preferred provider. MSW sent HH orders to Td .       Patients PCP is correct on the face sheet. Patient choice pharmacy is bedside delivery.     Patients family will transport the patient home at discharge.     CM team will continue to follow.      03/13/23 0975   Discharge Assessment   Assessment Type Discharge Planning Assessment   Confirmed/corrected address, phone number and insurance Yes   Confirmed Demographics Correct on Facesheet   Source of Information patient;family;health record   People in Home child(saman), adult   Do you expect to return to your current living situation? Yes   Do you have help at home or someone to help you manage your care at home? Yes   Prior to hospitilization cognitive status: Alert/Oriented   Current cognitive status: Alert/Oriented   Equipment Currently Used at Home none   Readmission within 30 days? No   Patient currently being followed by outpatient case management? No   Do you currently have service(s) that help you manage your care at home? No   Do you take prescription medications? Yes   Do you have prescription coverage? Yes   Do you have any problems affording any of your prescribed medications? No   Is the patient taking medications as prescribed? yes   How do you get to doctors appointments? car, drives self;family or friend will provide   Are you on dialysis? No   Do you take coumadin? No   Discharge Plan A Home Health   Discharge Plan B Rehab   Discharge Plan discussed with: Patient;Adult children   Discharge Barriers Identified None

## 2023-03-13 NOTE — TRANSFER OF CARE
Anesthesia Transfer of Care Note    Patient: Jen Alarcon    Procedure(s) Performed: Procedure(s) (LRB):  ARTHROPLASTY, KNEE, TOTAL REPLACEMENT (Left)    Patient location: PACU    Anesthesia Type: spinal    Transport from OR: Transported from OR on 2-3 L/min O2 by NC with adequate spontaneous ventilation    Post pain: adequate analgesia    Post assessment: no apparent anesthetic complications and tolerated procedure well    Post vital signs: stable    Level of consciousness: sedated and responds to stimulation    Nausea/Vomiting: no nausea/vomiting    Complications: none    Transfer of care protocol was followed      Last vitals:   Visit Vitals  /78 (BP Location: Left arm, Patient Position: Lying)   Pulse 63   Temp 36.3 °C (97.4 °F) (Oral)   Resp 16   Wt 68.9 kg (152 lb)   LMP  (LMP Unknown)   SpO2 100%   Breastfeeding No   BMI 27.80 kg/m²

## 2023-03-13 NOTE — PLAN OF CARE
03/13/23 1510   Final Note   Assessment Type Final Discharge Note   Anticipated Discharge Disposition Home-Health   Hospital Resources/Appts/Education Provided Provided patient/caregiver with written discharge plan information;Appointments scheduled and added to AVS;Appointments scheduled by Navigator/Coordinator   Post-Acute Status   Post-Acute Authorization Home Health   Discharge Delays None known at this time     Patient will discharge home with home health & DME.([facility] avina & KAYCEE,BSC)    Patient daughter will provide transportation home.     Patient discharge needs have been addressed from a SW standpoint.

## 2023-03-13 NOTE — PLAN OF CARE
Jen Alarcon has met all discharge criteria from Phase II. Vital Signs are stable, ambulating  without difficulty. Discharge instructions given, patient verbalized understanding. Discharged from facility via wheelchair in stable condition.

## 2023-03-13 NOTE — INTERVAL H&P NOTE
The patient has been examined and the H&P has been reviewed:    I concur with the findings and no changes have occurred since H&P was written.    Surgery risks, benefits and alternative options discussed and understood by patient/family.          Active Hospital Problems    Diagnosis  POA    *Unilateral primary osteoarthritis, left knee [M17.12]  Yes      Resolved Hospital Problems   No resolved problems to display.

## 2023-03-17 NOTE — DISCHARGE SUMMARY
[facility]  Short Stay  Discharge Summary  TOTAL KNEE REPLACEMENT    Admit Date: 3/13/2023    Discharge Date and Time: 3/13/2023  5:36 PM      Discharge Attending Physician: Art Yuan MD    Hospital Course:  Jen Alarcon,is a 63 y.o. female with severe osteoarthritis L knee, unrelieved with the conservative measures. The patient was admitted on  3/13/2023 and underwent L total knee replacement with computer-assisted navigation. Postoperatively, weightbearing as tolerated with a walker and CPM machine was initiated on postop day #1. Jen Alarcon did quite well and was discharged on 3/13/2023  with home health physical therapy and nursing. The patient will be on Percocet for pain and aspirin 325 mg p.o. b.i.d. with meals x4 weeks.  A CPM machine will will be sent home with the patient. Postoperative follow up will be in the office in 4 weeks.       Final Diagnoses:    Principal Problem: Unilateral primary osteoarthritis, left knee   Secondary Diagnoses:   Active Hospital Problems    Diagnosis  POA    *Unilateral primary osteoarthritis, left knee [M17.12]  Yes      Resolved Hospital Problems   No resolved problems to display.       Discharged Condition: good    Disposition: Home-Health Care [facility]    Follow up/Patient Instructions:    Medications:  Reconciled Home Medications:      Medication List        START taking these medications      aspirin 81 MG Chew  Take 1 tablet (81 mg total) by mouth 2 (two) times daily.     ondansetron 8 MG Tbdl  Commonly known as: ZOFRAN-ODT  Take 1 tablet (8 mg total) by mouth every 8 (eight) hours as needed (Nausea).     oxyCODONE-acetaminophen 5-325 mg per tablet  Commonly known as: PERCOCET  Take 1 tablet by mouth every 4 hours as needed or 2 tablets every 6 hours as needed            CONTINUE taking these medications      amLODIPine 5 MG tablet  Commonly known as: NORVASC  Take 10 mg by mouth.     hydroCHLOROthiazide 25 MG tablet  Commonly known as:  "HYDRODIURIL  Take 25 mg by mouth every evening.     losartan 100 MG tablet  Commonly known as: COZAAR  Take 100 mg by mouth.     rosuvastatin 5 MG tablet  Commonly known as: CRESTOR  Take 5 mg by mouth every evening.     ZIOPTAN (PF) 0.0015 % Dpet  Generic drug: tafluprost (PF)  Place 1 drop into both eyes every evening.            Discharge Procedure Orders   3 IN 1 COMMODE FOR HOME USE     Order Specific Question Answer Comments   Type: Standard    Height: 5'2"    Weight: 68.9 kg (152 lb)    Length of need (1-99 months): 99      BATH/SHOWER CHAIR FOR HOME USE     Order Specific Question Answer Comments   Height: 5'2"    Weight: 68.9 kg (152 lb)    Length of need (1-99 months): 99    Type: With back      TRANSFER TUB BENCH FOR HOME USE     Order Specific Question Answer Comments   Type of Transfer Tub Bench: Unpadded    Height: 5'2"    Weight: 68.9 kg (152 lb)    Length of need (1-99 months): 99    Patient notified - Not covered by insurance considered a convenience item Yes    Discussed financial responsibility with responsible party Yes      WALKER FOR HOME USE     Order Specific Question Answer Comments   Type of Walker: Rodger (4'4"-5'6")    With wheels? Yes    Height: 62 in    Weight: 68.9 kg (152 lb)    Length of need (1-99 months): 99    Does patient have medical equipment at home? none    Please check all that apply: Walker will be used for gait training.    Please check all that apply: Patient is unable to safely ambulate without equipment.      Diet general     Call MD for:  temperature >100.4     Call MD for:  persistent nausea and vomiting     Call MD for:  severe uncontrolled pain     Call MD for:  difficulty breathing, headache or visual disturbances     Call MD for:  redness, tenderness, or signs of infection (pain, swelling, redness, odor or green/yellow discharge around incision site)     Call MD for:  hives     Call MD for:  persistent dizziness or light-headedness     Call MD for:  extreme " fatigue      Follow-up Information       Art Yuan MD Follow up in 4 week(s).    Specialty: Orthopedic Surgery  Contact information:  [address]  [facility]  [facility] [address]  [phone]               [facility] Follow up on 3/14/2023.    Specialties: DME Provider, Home Health Services  Why: Patient will be seen 3/14/23...  Contact information:  [address] [place] [address]  [phone]

## 2023-03-23 DIAGNOSIS — Z96.652 HISTORY OF TOTAL KNEE REPLACEMENT, LEFT: Primary | ICD-10-CM

## 2023-03-28 ENCOUNTER — CLINICAL SUPPORT (OUTPATIENT)
Dept: REHABILITATION | Facility: OTHER | Age: 63
End: 2023-03-28
Attending: ORTHOPAEDIC SURGERY
Payer: COMMERCIAL

## 2023-03-28 DIAGNOSIS — M62.81 MUSCLE WEAKNESS OF LOWER EXTREMITY: ICD-10-CM

## 2023-03-28 DIAGNOSIS — Z74.09 IMPAIRED FUNCTIONAL MOBILITY, BALANCE, GAIT, AND ENDURANCE: ICD-10-CM

## 2023-03-28 DIAGNOSIS — M25.662 DECREASED RANGE OF MOTION OF LEFT KNEE: ICD-10-CM

## 2023-03-28 PROCEDURE — 97110 THERAPEUTIC EXERCISES: CPT | Mod: PN

## 2023-03-28 PROCEDURE — 97161 PT EVAL LOW COMPLEX 20 MIN: CPT | Mod: PN

## 2023-03-28 NOTE — PATIENT INSTRUCTIONS
ANKLE: Pumps        Point toes down, then up. _30_ reps per set, _2_ sets per day.    Copyright © [employer]. All rights reserved.   Quad Sets        Tighten top of left thigh. Hold for _5_ seconds. Repeat _20_ times. Do _2-3__ times a day.      Copyright © [employer]. All rights reserved.   Straight Leg Raise        Bend one leg. Raise other leg _6_ inches with knee locked. Exhale and tighten thigh muscles while raising leg. Repeat with other leg.  Repeat _10_ times, perform  2  sets. Do _2_ times per day.     https://[employer]/269     Copyright © [employer]. All rights reserved.   Heel Slide        Bend knee and pull heel toward buttocks. Hold _5_ seconds. Return. Repeat with other knee.  Repeat _30_ times. Do _2-3_ sessions per day.     https://[employer]/371     Copyright © [employer]. All rights reserved.   Hamstring Stretch (Sitting)        Sitting, extend one leg and place hands on same thigh for support. Keeping torso straight, lean forward, sliding hands down leg, until a stretch is felt in back of thigh.  Hold _30_ seconds. Perform  3  Times. Perform  2  times a day.    Copyright © [employer]. All rights reserved.     Heel Prop        Sit with leg propped (using a large towel, books, rollers, etc), relax letting the leg straighten into extension.    *Can also assist by placing your hand just above the knee and gently pressing down toward the floor.    Copyright © 2023 [employer]    CALF STRETCH WITH TOWEL - GASTROCNEMIUS        While in a seated position, place a towel around the ball of your foot and pull your ankle back until a stretch is felt on your calf area.    Keep your knee in a straightened position during the stretch.    Hold for 30 seconds. Perform 3 reps.    Copyright © 2023 [employer]    CALF STRETCH WITH TOWEL - SOLEUS        While in a seated position, place a towel around the ball of your foot and pull your ankle back until a stretch is felt on your calf area.    Keep your knee in a bent position during the  stretch    Hold for 30 seconds. Perform 3 reps.    Copyright © 2023 [employer]

## 2023-03-29 PROBLEM — M62.81 MUSCLE WEAKNESS OF LOWER EXTREMITY: Status: ACTIVE | Noted: 2023-03-29

## 2023-03-29 PROBLEM — Z74.09 IMPAIRED FUNCTIONAL MOBILITY, BALANCE, GAIT, AND ENDURANCE: Status: ACTIVE | Noted: 2023-03-29

## 2023-03-29 PROBLEM — M25.662 DECREASED RANGE OF MOTION OF LEFT KNEE: Status: ACTIVE | Noted: 2023-03-29

## 2023-03-29 NOTE — PLAN OF CARE
[facility]  Physical Therapy Initial Evaluation    Name: Jen Alarcon  Clinic Number: [id number]    Therapy Diagnosis:   Encounter Diagnoses   Name Primary?    Decreased range of motion of left knee     Muscle weakness of lower extremity     Impaired functional mobility, balance, gait, and endurance      Physician: Art Yuan MD    Physician Orders: PT Eval and Treat   Medical Diagnosis: Z96.652 (ICD-10-CM) - History of total knee replacement, left  Evaluation Date: 3/28/2023  Surgery Date: 3/13/2023  Authorization Period Expiration: 12/31/2023  Plan of Care Certification Period: 5/23/2023  Visit # / Visits authorized: 1/ 20    Time In: 7:47 am  Time Out: 8:38 am  Total Billable Time separate from evaluation: 20 minutes    Precautions: Standard    Subjective   Date of onset: 3/13/2023  History of current condition - JEN is  s/p L TKR on 3/13/2023. States she had sherwin having L knee pain for 7 years prior . Had HHPT and used CPM for L knee.        Past Medical History:   Diagnosis Date    Glaucoma     High cholesterol     HTN (hypertension)     SAGAR (obstructive sleep apnea)     Prediabetes      Jen Alarcon  has a past surgical history that includes Tumor removal (Bilateral) and Total knee arthroplasty (Left, 3/13/2023).    Jen CORONA has a current medication list which includes the following prescription(s): amlodipine, aspirin, hydrochlorothiazide, losartan, ondansetron, oxycodone-acetaminophen, rosuvastatin, and zioptan (pf).    Review of patient's allergies indicates:  No Known Allergies     Imaging,   3/13/2023  X-ray  FINDINGS:  Operative change from left total knee arthroplasty expected scattered subcutaneous emphysema and ventral surgical skin staples.  Fine details limited by overlying bandage device.  Alignment within normal limits allowing for slight oblique positioning lateral view.  Clinical correlation and follow-up advised.       Prior Therapy: PT for back and R  "knee  Social History:  lives with their daughter. 6 steps to enter. Bedroom upstairs  Occupation: working from home, accounting  Prior Level of Function: amb without a.d  Current Level of Function: amb with straight cane. Not driving yet    Pain:  Current 3/10, worst 3/10, best 0/10   Location: left knee   Description: Aching, Throbbing, and Sharp  Aggravating Factors: bending, extension, prolonged sitting, increased walking distances, and negotiating steps.  Easing Factors: pain medication    Pts goals: "Be able to do everything I need to do. Return to normal."    Objective       Functional assessment:   - walking: amb with straight cane  - sit to stand: I      Joint mobility:  L knee AROM (in sitting): 25 to 75 deg (start of session)  L knee: 4 deg to 90 deg         CMS Impairment/Limitation/Restriction for FOTO Knee Survey    Therapist reviewed FOTO scores for Jen Alarcon on 3/28/2023.   FOTO documents entered into [employer] - see Media section.    Limitation Score: 48%  Category: Mobility    Current : CK = at least 40% but < 60% impaired, limited or restricted  Goal: CJ = at least 20% but < 40% impaired, limited or restricted       TREATMENT   Treatment Time In: 8:15 am  Treatment Time Out: 8:35 am  Total Treatment time separate from Evaluation time: 20 minutes    JEN received therapeutic exercises to develop strength, endurance, ROM, and flexibility for 20 minutes including:  Quad sets with towel under knee 20 x 5"  Heel prop with 3# above and below knee x 3'  Heel slides x 15 reps  Long sitting hamstring stretch 3 x 30"  Gastroc stretch with towel 3 x 30"  Soleus stretch with towel 3 x 30"      Home Exercises Provided and Patient Education Provided     Education provided:   - Discussed the role of the PTA on the Rehab Team. Discussed patient will be seen by a physical therapist minimally every 6th visit or every 30 days prior to being seen by PTA. Also discussed the use of the [facility] for " communication.      Quad sets with towel under knee   Heel prop   Heel slides   Long sitting hamstring stretch   Gastroc stretch with towel   Soleus stretch with towel   Ankle pumps  SLR    Written Home Exercises Provided: yes.  Exercises were reviewed and JEN was able to demonstrate them prior to the end of the session.  JEN demonstrated good  understanding of the education provided.     See EMR under Patient Instructions for exercises provided 3/28/2023.  Assessment   Jen CORONA is a 63 y.o. female referred to outpatient Physical Therapy with a medical diagnosis of Z96.652 (ICD-10-CM) - History of total knee replacement, left. Pt referred to OP PT for post operative rehabilitation following L TKR. Presenting with impaired mobility, decreased L knee ROM, decreased B LE strength and flexibility. Patient will benefit from OP PT for manual therapy to decrease L knee pain and increase L knee ROM, gait trainingto promote I community ambulation on level surfaces and I with stairs. PT discussed the importance of full extension for the L knee. Also spent time focusing on quad activation. Pt presenting with weak quad activation, but demonstrated understanding. May be a candidate for electrical stimulation for quad activation. Also discussed elevation and ice at home to reduce edema in L knee.     Pt prognosis is Good.   Pt will benefit from skilled outpatient Physical Therapy to address the deficits stated above and in the chart below, provide pt/family education, and to maximize pt's level of independence.     Plan of care discussed with patient: Yes  Pt's spiritual, cultural and educational needs considered and patient is agreeable to the plan of care and goals as stated below:     Anticipated Barriers for therapy: none    Medical Necessity is demonstrated by the following  History  Co-morbidities and personal factors that may impact the plan of care Co-morbidities:   HTN and glaucoma    Personal Factors:   no deficits      moderate   Examination  Body Structures and Functions, activity limitations and participation restrictions that may impact the plan of care Body Regions:   lower extremities    Body Systems:    gross symmetry  ROM  strength  gross coordinated movement  balance  gait  transfers  transitions    Participation Restrictions:   none    Activity limitations:   Learning and applying knowledge  no deficits    General Tasks and Commands  no deficits    Communication  no deficits    Mobility  lifting and carrying objects  walking  driving (bike, car, motorcycle)    Self care  no deficits    Domestic Life  shopping  cooking  doing house work (cleaning house, washing dishes, laundry)  assisting others    Interactions/Relationships  no deficits    Life Areas  no deficits    Community and Social Life  no deficits         moderate   Clinical Presentation stable and uncomplicated low   Decision Making/ Complexity Score: low     Goals:  Short Term Goals: 4 weeks   Independent with HEP.  Report decreased L knee pain < or =  2/10 with adls such as bending, extension, prolonged sitting, increased walking distances, and negotiating steps.  No extensor lag in L knee with straight leg raises.   Increase L knee extension to full extension  Pt demonstrating good, strong quad activation.  Increased L knee AROM flex to 110 deg.  I household ambulation.  Up/down stairs with mod I using alternating step pattern.      Long Term Goals: 8 weeks   Increased L knee AROM 0 to 120 deg.  I community ambulation  I negotiating stairs with alternating step pattern  Report decreased L knee pain < or =  1/10 with adls such as bending, extension, prolonged sitting, increased walking distances, and negotiating steps.  Increased MMT for B LE to 4/5 muscle grade to promote proper pelvic stability to decrease L knee pain < or =  1/10 with adls such as bending, extension, prolonged sitting, increased walking distances, and negotiating steps.  Increased  flexibility in B hamstrings to -20 deg with 90/90 test to promote proper pelvic stability to decrease L knee pain < or =  1/10 with adls such as bending, extension, prolonged sitting, increased walking distances, and negotiating steps.   Patient to achieve CJ (at least 20% < 40% impaired, limited or restricted) level on the FOTO Outcomes Measurement System.    Plan   Certification Period/Plan of care expiration: 3/28/2023 to 5/23/2023.    Outpatient Physical Therapy 2 times weekly for 8 weeks to include the following interventions: Gait Training, Manual Therapy, Moist Heat/ Ice, Neuromuscular Re-ed, Patient Education, Therapeutic Activities, and Therapeutic Exercise.     Freddy Martinez, PT

## 2023-03-30 NOTE — PROGRESS NOTES
[facility]   Physical Therapy Treatment Note     Name: Jen Alarcon  Clinic Number: [id number]    Therapy Diagnosis:   Encounter Diagnoses   Name Primary?    Decreased range of motion of left knee Yes    Muscle weakness of lower extremity     Impaired functional mobility, balance, gait, and endurance      Physician: Art Yuan MD    Visit Date: 3/31/2023    Physician Orders: PT Eval and Treat   Medical Diagnosis: Z96.652 (ICD-10-CM) - History of total knee replacement, left  Evaluation Date: 3/28/2023  Surgery Date: 3/13/2023  Authorization Period Expiration: 12/31/2023  Plan of Care Certification Period: 5/23/2023  Visit # / Visits authorized: 2 (1/ 20)  FOTO: 1/ 5 (Last issued: 3/28/2023)  PTA Visit #: 1/ 5    Precautions: Standard    Time In: 7:50 am  Time Out: 8:34 am  Total Billable Time: 44 minutes    SUBJECTIVE   Pt reports: Felt good this morning and took pain meds to prepare for therapy. Has some discomfort when trying to sleep but once she finds a comfortable spot, she's able to sleep through the night.      She was compliant with home exercise program.  Response to previous treatment: No adverse effect  Functional change: None today    Pain: 2/10 stiffness  Location: R knee    OBJECTIVE     3/31/2023:  L knee AROM: 4 to 93 deg    3/28/2023:  Functional assessment:   - walking: amb with straight cane  - sit to stand: I     Joint mobility:  L knee AROM (in sitting): 25 to 75 deg (start of session)  L knee: 4 deg to 90 deg      CMS Impairment/Limitation/Restriction for FOTO Knee Survey     Therapist reviewed FOTO scores for Jen Alarcon on 3/28/2023.   FOTO documents entered into [employer] - see Media section.     Limitation Score: 48%  Category: Mobility     Current : CK = at least 40% but < 60% impaired, limited or restricted  Goal: CJ = at least 20% but < 40% impaired, limited or restricted     TREATMENT       JEN received the bolded treatments listed below:   "    Patient received therapeutic exercises for 44 minutes for improved strength and AROM including:  Quad sets with towel under knee 20 x 5"  Heel prop with 3# above and below knee x 3'  Heel slides x 10  10" hold   Hamstring stretch 3 x 30"  Gastroc stretch 3 x 30"  Soleus stretch 3 x 30"  SLR x 20  +S/L hip abd x 15    Patient received manual therapeutic technique for 00 minutes for improved soft tissue and joint mobility including:        Patient received neuromuscular reeducation for 00 minutes for improved proprioception and balance including:        Patient received therapeutic activities for 00 minutes for improved tolerance to functional activities including:        PATIENT EDUCATION AND HOME EXERCISES     Home Exercises Provided and Patient Education Provided     Education provided:   PT educated pt on importance of compliance with their HEP this visit.     Written Home Exercises Provided: Patient instructed to cont prior HEP. Exercises were reviewed and RANDY was able to demonstrate them prior to the end of the session.  RANDY demonstrated good  understanding of the education provided. See EMR under Patient Instructions for exercises provided during therapy sessions    ASSESSMENT   Pt tolerated treatment well with some discomfort noted in shin and behind the knee. Knee ROM slightly improved in flexion at the end of today's session. Will continue to progress pt towards ROM and strength goals.    RANDY Is progressing well towards her goals.   Pt prognosis is Good.     Pt will continue to benefit from skilled outpatient physical therapy to address the deficits listed in the problem list box on initial evaluation, provide pt/family education and to maximize pt's level of independence in the home and community environment.     Pt's spiritual, cultural and educational needs considered and pt agreeable to plan of care and goals.     Anticipated barriers to physical therapy: None    Goals:   Short Term Goals: 4 " weeks   Independent with HEP.  Report decreased L knee pain < or =  2/10 with adls such as bending, extension, prolonged sitting, increased walking distances, and negotiating steps.  No extensor lag in L knee with straight leg raises.   Increase L knee extension to full extension  Pt demonstrating good, strong quad activation.  Increased L knee AROM flex to 110 deg.  I household ambulation.  Up/down stairs with mod I using alternating step pattern.     Long Term Goals: 8 weeks   Increased L knee AROM 0 to 120 deg.  I community ambulation  I negotiating stairs with alternating step pattern  Report decreased L knee pain < or =  1/10 with adls such as bending, extension, prolonged sitting, increased walking distances, and negotiating steps.  Increased MMT for B LE to 4/5 muscle grade to promote proper pelvic stability to decrease L knee pain < or =  1/10 with adls such as bending, extension, prolonged sitting, increased walking distances, and negotiating steps.  Increased flexibility in B hamstrings to -20 deg with 90/90 test to promote proper pelvic stability to decrease L knee pain < or =  1/10 with adls such as bending, extension, prolonged sitting, increased walking distances, and negotiating steps.   Patient to achieve CJ (at least 20% < 40% impaired, limited or restricted) level on the FOTO Outcomes Measurement System.    PLAN   Certification Period/Plan of care expiration: 3/28/2023 to 5/23/2023.    Improve L knee ROM and strength     Outpatient Physical Therapy 2 times weekly for 8 weeks to include the following interventions: Gait Training, Manual Therapy, Moist Heat/ Ice, Neuromuscular Re-ed, Patient Education, Therapeutic Activities, and Therapeutic Exercise.        Rosmery Bailey III, PTA

## 2023-03-31 ENCOUNTER — CLINICAL SUPPORT (OUTPATIENT)
Dept: REHABILITATION | Facility: OTHER | Age: 63
End: 2023-03-31
Payer: COMMERCIAL

## 2023-03-31 DIAGNOSIS — M25.662 DECREASED RANGE OF MOTION OF LEFT KNEE: Primary | ICD-10-CM

## 2023-03-31 DIAGNOSIS — Z74.09 IMPAIRED FUNCTIONAL MOBILITY, BALANCE, GAIT, AND ENDURANCE: ICD-10-CM

## 2023-03-31 DIAGNOSIS — M62.81 MUSCLE WEAKNESS OF LOWER EXTREMITY: ICD-10-CM

## 2023-03-31 PROCEDURE — 97110 THERAPEUTIC EXERCISES: CPT | Mod: PN,CQ

## 2023-04-03 ENCOUNTER — CLINICAL SUPPORT (OUTPATIENT)
Dept: REHABILITATION | Facility: OTHER | Age: 63
End: 2023-04-03
Payer: COMMERCIAL

## 2023-04-03 ENCOUNTER — DOCUMENTATION ONLY (OUTPATIENT)
Dept: REHABILITATION | Facility: OTHER | Age: 63
End: 2023-04-03
Payer: COMMERCIAL

## 2023-04-03 DIAGNOSIS — Z74.09 IMPAIRED FUNCTIONAL MOBILITY, BALANCE, GAIT, AND ENDURANCE: ICD-10-CM

## 2023-04-03 DIAGNOSIS — M62.81 MUSCLE WEAKNESS OF LOWER EXTREMITY: ICD-10-CM

## 2023-04-03 DIAGNOSIS — M25.662 DECREASED RANGE OF MOTION OF LEFT KNEE: Primary | ICD-10-CM

## 2023-04-03 PROCEDURE — 97110 THERAPEUTIC EXERCISES: CPT | Mod: PN,CQ

## 2023-04-03 NOTE — PROGRESS NOTES
"[facility]   Physical Therapy Treatment Note     Name: Jen Alarcon  Clinic Number: [id number]    Therapy Diagnosis:   Encounter Diagnoses   Name Primary?    Decreased range of motion of left knee Yes    Muscle weakness of lower extremity     Impaired functional mobility, balance, gait, and endurance      Physician: Art Yuan MD    Visit Date: 4/3/2023    Physician Orders: PT Eval and Treat   Medical Diagnosis: Z96.652 (ICD-10-CM) - History of total knee replacement, left  Evaluation Date: 3/28/2023  Surgery Date: 3/13/2023  Authorization Period Expiration: 12/31/2023  Plan of Care Certification Period: 5/23/2023  Visit # / Visits authorized: 3 (2/ 20)  FOTO: 1/ 5 (Last issued: 3/28/2023)  PTA Visit #: 2/ 5    Precautions: Standard    Time In: 1215  Time Out: 1300  Total Billable Time: 45 minutes    SUBJECTIVE   Pt states feeling well w/ no c/o pn in R knee currently.      She was compliant with home exercise program.  Response to previous treatment: No adverse effect  Functional change: None today    Pain: 2/10 stiffness  Location: L knee    OBJECTIVE        JEN received the bolded treatments listed below:      Patient received therapeutic exercises for 40 minutes for improved strength and AROM including:  Quad sets with towel under knee 20 x 5"  Heel prop with 3# above and below knee x 3'  Heel slides x 10  10" hold   Seated Hamstring stretch 3 x 30"  Seated w/ strap Gastroc stretch 3 x 30"  Seated w/ strap Soleus stretch 3 x 30"  SLR x 20  +S/L hip abd x 15    Patient received manual therapeutic technique for 00 minutes for improved soft tissue and joint mobility including:        Patient received neuromuscular reeducation for 00 minutes for improved proprioception and balance including:        Patient received therapeutic activities for 00 minutes for improved tolerance to functional activities including:    Patient received Gait Training for 5 minutes today that included: "   Heel toe rocking in // 20 x   Toe off high knee as grzegorz in // 20 x     PATIENT EDUCATION AND HOME EXERCISES     Home Exercises Provided and Patient Education Provided     Education provided:   Pt edu on proper exercise technique.      Written Home Exercises Provided: Patient instructed to cont prior HEP. Exercises were reviewed and RANDY was able to demonstrate them prior to the end of the session.  RANDY demonstrated good  understanding of the education provided. See EMR under Patient Instructions for exercises provided during therapy sessions    ASSESSMENT   Pt showed improved quad control and strength during therex.  Pt cot to have some ext lag with SLR.  Pt to tx well.  Pn level remained same prior to and after tx session.  Pt would cont to benefit from skilled care to increase knee ext/ flexion and improve gait pattern.      RANDY Is progressing well towards her goals.   Pt prognosis is Good.     Pt will continue to benefit from skilled outpatient physical therapy to address the deficits listed in the problem list box on initial evaluation, provide pt/family education and to maximize pt's level of independence in the home and community environment.     Pt's spiritual, cultural and educational needs considered and pt agreeable to plan of care and goals.     Anticipated barriers to physical therapy: None    Goals:   Short Term Goals: 4 weeks   Independent with HEP.  Report decreased L knee pain < or =  2/10 with adls such as bending, extension, prolonged sitting, increased walking distances, and negotiating steps.  No extensor lag in L knee with straight leg raises.   Increase L knee extension to full extension  Pt demonstrating good, strong quad activation.  Increased L knee AROM flex to 110 deg.  I household ambulation.  Up/down stairs with mod I using alternating step pattern.     Long Term Goals: 8 weeks   Increased L knee AROM 0 to 120 deg.  I community ambulation  I negotiating stairs with alternating step  pattern  Report decreased L knee pain < or =  1/10 with adls such as bending, extension, prolonged sitting, increased walking distances, and negotiating steps.  Increased MMT for B LE to 4/5 muscle grade to promote proper pelvic stability to decrease L knee pain < or =  1/10 with adls such as bending, extension, prolonged sitting, increased walking distances, and negotiating steps.  Increased flexibility in B hamstrings to -20 deg with 90/90 test to promote proper pelvic stability to decrease L knee pain < or =  1/10 with adls such as bending, extension, prolonged sitting, increased walking distances, and negotiating steps.   Patient to achieve CJ (at least 20% < 40% impaired, limited or restricted) level on the FOTO Outcomes Measurement System.    PLAN   Cont to progress towards goals set by PT.  Work to increase L knee ROM and quad strength next visit.      Connor Ledesma, PTA

## 2023-04-03 NOTE — PROGRESS NOTES
PT/PTA met face to face to discuss pt's treatment plan and progress towards established goals. Pt will be seen by a physical therapist minimally every 6th visit or every 30 days.    Rosmery Bailey III, PTA    Meeting as noted above.    Freddy Martinez, PT, CWS, Cert DN

## 2023-04-06 NOTE — PROGRESS NOTES
"[facility]   Physical Therapy Treatment Note     Name: Jen Alarcon  Clinic Number: [id number]    Therapy Diagnosis:   Encounter Diagnoses   Name Primary?    Decreased range of motion of left knee Yes    Muscle weakness of lower extremity     Impaired functional mobility, balance, gait, and endurance      Physician: Art Yuan MD    Visit Date: 4/7/2023    Physician Orders: PT Eval and Treat   Medical Diagnosis: Z96.652 (ICD-10-CM) - History of total knee replacement, left  Evaluation Date: 3/28/2023  Surgery Date: 3/13/2023  Authorization Period Expiration: 12/31/2023  Plan of Care Certification Period: 5/23/2023  Visit # / Visits authorized: 4 (3/ 20)  FOTO: 3/ 5 (Last issued: 3/28/2023)  PTA Visit #: 3/ 5    Precautions: Standard    Time In: 7:00 am  Time Out: 7:48 am  Total Billable Time: 48 minutes    SUBJECTIVE   Pt reports: No pain this morning and really only experiences pain at night when in bed    She was compliant with home exercise program.  Response to previous treatment: "I didn't have any pain that night"  Functional change: None today    Pain: 1/10 stiffness  Location: L knee    OBJECTIVE   4/7/2023:  L knee AROM: 4 to 91 deg    3/31/2023:  L knee AROM: 4 to 93 deg     3/28/2023:  Functional assessment:   - walking: amb with straight cane  - sit to stand: I     Joint mobility:  L knee AROM (in sitting): 25 to 75 deg (start of session)  L knee: 4 deg to 90 deg      CMS Impairment/Limitation/Restriction for FOTO Knee Survey     Therapist reviewed FOTO scores for Jen Alarcon on 3/28/2023.   FOTO documents entered into [employer] - see Media section.     Limitation Score: 48%  Category: Mobility     Current : CK = at least 40% but < 60% impaired, limited or restricted  Goal: CJ = at least 20% but < 40% impaired, limited or restricted      TREATMENT     JEN received the bolded treatments listed below:      Patient received therapeutic exercises for 39 minutes for " "improved strength and AROM including:  Quad sets with towel under knee 20 x 5"  Heel prop with 3# above and below knee x 3'  Heel slides x 10  10" hold   Hamstring stretch 3 x 30"  Gastroc stretch on incline 3 x 30"  +LAQ 3 x 10  Seated w/ strap Soleus stretch 3 x 30"  SLR x 20  S/L hip abd x 15  +TKE with thin red super band 2 x 10    Patient received manual therapeutic technique for 00 minutes for improved soft tissue and joint mobility including:        Patient received neuromuscular reeducation for 00 minutes for improved proprioception and balance including:        Patient received therapeutic activities for 9 minutes for improved tolerance to functional activities including:  +Retro walking on TM x 6'  +STS x 15    Patient received gait training for 00 minutes today that included:   Heel toe rocking in // 20 x   Toe off high knee as grzegorz in // 20 x     PATIENT EDUCATION AND HOME EXERCISES     Home Exercises Provided and Patient Education Provided     Education provided:   Pt edu on proper exercise technique.      Written Home Exercises Provided: Patient instructed to cont prior HEP. Exercises were reviewed and RANDY was able to demonstrate them prior to the end of the session.  RANDY demonstrated good  understanding of the education provided. See EMR under Patient Instructions for exercises provided during therapy sessions    ASSESSMENT   Pt completed treatment with no adverse effects. Cuing to emphasize knee extension with toe to heel transition during retro walking. Pt demonstrated favoring of R LE with sit to stands, had pt bring R foot forwards to complete reps and correct form. Pt continues to lack knee extension and wasn't able to achieve degrees of flexion reached from previous treatment.     RANDY Is progressing well towards her goals.   Pt prognosis is Good.     Pt will continue to benefit from skilled outpatient physical therapy to address the deficits listed in the problem list box on initial " evaluation, provide pt/family education and to maximize pt's level of independence in the home and community environment.     Pt's spiritual, cultural and educational needs considered and pt agreeable to plan of care and goals.     Anticipated barriers to physical therapy: None    Goals:   Short Term Goals: 4 weeks   Independent with HEP.  Report decreased L knee pain < or =  2/10 with adls such as bending, extension, prolonged sitting, increased walking distances, and negotiating steps.  No extensor lag in L knee with straight leg raises.   Increase L knee extension to full extension  Pt demonstrating good, strong quad activation.  Increased L knee AROM flex to 110 deg.  I household ambulation.  Up/down stairs with mod I using alternating step pattern.     Long Term Goals: 8 weeks   Increased L knee AROM 0 to 120 deg.  I community ambulation  I negotiating stairs with alternating step pattern  Report decreased L knee pain < or =  1/10 with adls such as bending, extension, prolonged sitting, increased walking distances, and negotiating steps.  Increased MMT for B LE to 4/5 muscle grade to promote proper pelvic stability to decrease L knee pain < or =  1/10 with adls such as bending, extension, prolonged sitting, increased walking distances, and negotiating steps.  Increased flexibility in B hamstrings to -20 deg with 90/90 test to promote proper pelvic stability to decrease L knee pain < or =  1/10 with adls such as bending, extension, prolonged sitting, increased walking distances, and negotiating steps.   Patient to achieve CJ (at least 20% < 40% impaired, limited or restricted) level on the FOTO Outcomes Measurement System.    PLAN   Certification Period/Plan of care expiration: 3/28/2023 to 5/23/2023.     Improve L knee ROM and strength     Outpatient Physical Therapy 2 times weekly for 8 weeks to include the following interventions: Gait Training, Manual Therapy, Moist Heat/ Ice, Neuromuscular Re-ed, Patient  Education, Therapeutic Activities, and Therapeutic Exercise.     Rosmery Bailey III, PTA

## 2023-04-07 ENCOUNTER — CLINICAL SUPPORT (OUTPATIENT)
Dept: REHABILITATION | Facility: OTHER | Age: 63
End: 2023-04-07
Payer: COMMERCIAL

## 2023-04-07 DIAGNOSIS — M62.81 MUSCLE WEAKNESS OF LOWER EXTREMITY: ICD-10-CM

## 2023-04-07 DIAGNOSIS — Z74.09 IMPAIRED FUNCTIONAL MOBILITY, BALANCE, GAIT, AND ENDURANCE: ICD-10-CM

## 2023-04-07 DIAGNOSIS — M25.662 DECREASED RANGE OF MOTION OF LEFT KNEE: Primary | ICD-10-CM

## 2023-04-07 PROCEDURE — 97110 THERAPEUTIC EXERCISES: CPT | Mod: PN,CQ

## 2023-04-07 PROCEDURE — 97530 THERAPEUTIC ACTIVITIES: CPT | Mod: PN,CQ

## 2023-04-10 NOTE — PROGRESS NOTES
"[facility]   Physical Therapy Treatment Note     Name: Jen Alarcon  Clinic Number: [id number]    Therapy Diagnosis:   Encounter Diagnoses   Name Primary?    Decreased range of motion of left knee Yes    Muscle weakness of lower extremity     Impaired functional mobility, balance, gait, and endurance      Physician: Art Yuan MD    Visit Date: 4/11/2023    Physician Orders: PT Eval and Treat   Medical Diagnosis: Z96.652 (ICD-10-CM) - History of total knee replacement, left  Evaluation Date: 3/28/2023  Surgery Date: 3/13/2023  Authorization Period Expiration: 12/31/2023  Plan of Care Certification Period: 5/23/2023  Visit # / Visits authorized: 5 (4/ 20)  FOTO: 4/ 5 (Last issued: 3/28/2023)  PTA Visit #: 4/ 5    Precautions: Standard    Time In: 8:30 am  Time Out: 9:16 am  Total Billable Time: 46 minutes    SUBJECTIVE   Pt reports: "I feel a band of soreness" above the knee    She was compliant with home exercise program.  Response to previous treatment: "It was good"  Functional change: None today    Pain: 1/10 stiffness  Location: L knee    OBJECTIVE   4/11/2023:  L knee AROM: 4 to 94 deg    4/7/2023:  L knee AROM: 4 to 91 deg    3/31/2023:  L knee AROM: 4 to 93 deg     3/28/2023:  Functional assessment:   - walking: amb with straight cane  - sit to stand: I     Joint mobility:  L knee AROM (in sitting): 25 to 75 deg (start of session)  L knee: 4 deg to 90 deg      CMS Impairment/Limitation/Restriction for FOTO Knee Survey     Therapist reviewed FOTO scores for Jen Alarcon on 3/28/2023.   FOTO documents entered into [employer] - see Media section.     Limitation Score: 48%  Category: Mobility     Current : CK = at least 40% but < 60% impaired, limited or restricted  Goal: CJ = at least 20% but < 40% impaired, limited or restricted      TREATMENT     JEN received the bolded treatments listed below:      Patient received therapeutic exercises for 30 minutes for improved strength " "and AROM including:  Quad sets with towel under knee 20 x 5"  Heel prop with 3# above and below knee x 3'  +Prone hangs 2# x 3'  Heel slides x 10  10" hold   Hamstring stretch 3 x 30"  Gastroc stretch on incline 3 x 30"  LAQ 3 x 10  Seated w/ strap Soleus stretch 3 x 30"  SLR x 20  S/L hip abd x 15  TKE with thin red super band 2 x 10    Patient received manual therapeutic technique for 8 minutes for improved soft tissue and joint mobility including:  PA/AP Tibiofemoral mob      Patient received neuromuscular reeducation for 00 minutes for improved proprioception and balance including:        Patient received therapeutic activities for 8 minutes for improved tolerance to functional activities including:  Retro walking on TM x 5'  STS x 15    Patient received gait training for 00 minutes today that included:   Heel toe rocking in // 20 x   Toe off high knee as grzegorz in // 20 x     PATIENT EDUCATION AND HOME EXERCISES     Home Exercises Provided and Patient Education Provided     Education provided:   Pt edu on proper exercise technique.      Written Home Exercises Provided: Patient instructed to cont prior HEP. Exercises were reviewed and RANDY was able to demonstrate them prior to the end of the session.  RANDY demonstrated good  understanding of the education provided. See EMR under Patient Instructions for exercises provided during therapy sessions    ASSESSMENT   Pt tolerated treatment well with pain noted in anterior knee with mobilizations. Limited knee ROM is still present, although a minor improvement was made in flexion from previous treatment. Will include more time with mobilizations and implement some overpressure for tomorrow's session.    RANDY Is progressing well towards her goals.   Pt prognosis is Good.     Pt will continue to benefit from skilled outpatient physical therapy to address the deficits listed in the problem list box on initial evaluation, provide pt/family education and to maximize pt's " level of independence in the home and community environment.     Pt's spiritual, cultural and educational needs considered and pt agreeable to plan of care and goals.     Anticipated barriers to physical therapy: None    Goals:   Short Term Goals: 4 weeks   Independent with HEP.  Report decreased L knee pain < or =  2/10 with adls such as bending, extension, prolonged sitting, increased walking distances, and negotiating steps.  No extensor lag in L knee with straight leg raises.   Increase L knee extension to full extension  Pt demonstrating good, strong quad activation.  Increased L knee AROM flex to 110 deg.  I household ambulation.  Up/down stairs with mod I using alternating step pattern.     Long Term Goals: 8 weeks   Increased L knee AROM 0 to 120 deg.  I community ambulation  I negotiating stairs with alternating step pattern  Report decreased L knee pain < or =  1/10 with adls such as bending, extension, prolonged sitting, increased walking distances, and negotiating steps.  Increased MMT for B LE to 4/5 muscle grade to promote proper pelvic stability to decrease L knee pain < or =  1/10 with adls such as bending, extension, prolonged sitting, increased walking distances, and negotiating steps.  Increased flexibility in B hamstrings to -20 deg with 90/90 test to promote proper pelvic stability to decrease L knee pain < or =  1/10 with adls such as bending, extension, prolonged sitting, increased walking distances, and negotiating steps.   Patient to achieve CJ (at least 20% < 40% impaired, limited or restricted) level on the FOTO Outcomes Measurement System.    PLAN   Certification Period/Plan of care expiration: 3/28/2023 to 5/23/2023.     Improve L knee ROM and strength     Outpatient Physical Therapy 2 times weekly for 8 weeks to include the following interventions: Gait Training, Manual Therapy, Moist Heat/ Ice, Neuromuscular Re-ed, Patient Education, Therapeutic Activities, and Therapeutic Exercise.      Rosmery Bailey III, PTA

## 2023-04-11 ENCOUNTER — CLINICAL SUPPORT (OUTPATIENT)
Dept: REHABILITATION | Facility: OTHER | Age: 63
End: 2023-04-11
Payer: COMMERCIAL

## 2023-04-11 DIAGNOSIS — Z74.09 IMPAIRED FUNCTIONAL MOBILITY, BALANCE, GAIT, AND ENDURANCE: ICD-10-CM

## 2023-04-11 DIAGNOSIS — M25.662 DECREASED RANGE OF MOTION OF LEFT KNEE: Primary | ICD-10-CM

## 2023-04-11 DIAGNOSIS — M62.81 MUSCLE WEAKNESS OF LOWER EXTREMITY: ICD-10-CM

## 2023-04-11 PROCEDURE — 97530 THERAPEUTIC ACTIVITIES: CPT | Mod: PN,CQ

## 2023-04-11 PROCEDURE — 97110 THERAPEUTIC EXERCISES: CPT | Mod: PN,CQ

## 2023-04-11 PROCEDURE — 97140 MANUAL THERAPY 1/> REGIONS: CPT | Mod: PN,CQ

## 2023-04-11 NOTE — PROGRESS NOTES
"[facility]   Physical Therapy Treatment Note     Name: Jen Alarcon  Clinic Number: [id number]    Therapy Diagnosis:   Encounter Diagnoses   Name Primary?    Decreased range of motion of left knee Yes    Muscle weakness of lower extremity     Impaired functional mobility, balance, gait, and endurance      Physician: Art Yuan MD    Visit Date: 4/12/2023    Physician Orders: PT Eval and Treat   Medical Diagnosis: Z96.652 (ICD-10-CM) - History of total knee replacement, left  Evaluation Date: 3/28/2023  Surgery Date: 3/13/2023  Authorization Period Expiration: 12/31/2023  Plan of Care Certification Period: 5/23/2023  Visit # / Visits authorized: 6 (5/ 20)  FOTO: 5/ 5 (Last issued: 3/28/2023)  PTA Visit #: 5/ 5    Precautions: Standard    Time In: 3:38 pm  Time Out: 4:40 pm  Total Billable Time: 58 minutes    SUBJECTIVE   Pt reports: A little more stiffness today. Doctor is happy with 90 degrees of flexion at this point.    She was compliant with home exercise program.  Response to previous treatment: "I felt good"  Functional change: None today    Pain: 3/10 stiffness  Location: L knee    OBJECTIVE   4/12/2023:  L knee AROM: 2 to 97 deg    4/11/2023:  L knee AROM: 4 to 94 deg    4/7/2023:  L knee AROM: 4 to 91 deg    3/31/2023:  L knee AROM: 4 to 93 deg     3/28/2023:  Functional assessment:   - walking: amb with straight cane  - sit to stand: I     Joint mobility:  L knee AROM (in sitting): 25 to 75 deg (start of session)  L knee: 4 deg to 90 deg      CMS Impairment/Limitation/Restriction for FOTO Knee Survey     Therapist reviewed FOTO scores for Jen Alarcon on 3/28/2023.   FOTO documents entered into [employer] [facility] - see Media section.     Limitation Score: 48%  Category: Mobility     Current : CK = at least 40% but < 60% impaired, limited or restricted  Goal: CJ = at least 20% but < 40% impaired, limited or restricted      TREATMENT     JEN received the bolded treatments listed " "below:      Patient received therapeutic exercises for 30 minutes for improved strength and AROM including:  Quad sets with towel under knee 20 x 5"  Heel prop with 3# above and below knee x 3'  Prone hangs 2# x 5'  Heel slides x 10  10" hold   Hamstring stretch 3 x 30"  Gastroc stretch on incline 3 x 30"  LAQ 3 x 10  Seated w/ strap Soleus stretch 3 x 30"  SLR x 20  S/L hip abd x 15  TKE with thin red super band 2 x 10    Future visit:  Recumbent bike    Patient received manual therapeutic technique for 23 minutes for improved soft tissue and joint mobility including:  PA/AP Tibiofemoral mob  +Overpressure into extension     Patient received neuromuscular reeducation for 00 minutes for improved proprioception and balance including:        Patient received therapeutic activities for 5 minutes for improved tolerance to functional activities including:  Retro walking on TM x 5'  STS x 15    Patient received gait training for 00 minutes today that included:   Heel toe rocking in // 20 x   Toe off high knee as grzegorz in // 20 x     PATIENT EDUCATION AND HOME EXERCISES     Home Exercises Provided and Patient Education Provided     Education provided:   - Continuance of HEP, especially extension    Written Home Exercises Provided: Patient instructed to cont prior HEP. Exercises were reviewed and RANDY was able to demonstrate them prior to the end of the session.  RANDY demonstrated good  understanding of the education provided. See EMR under Patient Instructions for exercises provided during therapy sessions    ASSESSMENT   Pt tolerated treatment well with pain in L knee with overpressure. L knee ROM remains limited, but improvements in both flexion and extension were made in today's treatment. Will continue to work towards achieving full extension and improving flexion.     RANDY Is progressing well towards her goals.   Pt prognosis is Good.     Pt will continue to benefit from skilled outpatient physical therapy to address " the deficits listed in the problem list box on initial evaluation, provide pt/family education and to maximize pt's level of independence in the home and community environment.     Pt's spiritual, cultural and educational needs considered and pt agreeable to plan of care and goals.     Anticipated barriers to physical therapy: None    Goals:   Short Term Goals: 4 weeks   Independent with HEP.  Report decreased L knee pain < or =  2/10 with adls such as bending, extension, prolonged sitting, increased walking distances, and negotiating steps.  No extensor lag in L knee with straight leg raises.   Increase L knee extension to full extension  Pt demonstrating good, strong quad activation.  Increased L knee AROM flex to 110 deg.  I household ambulation.  Up/down stairs with mod I using alternating step pattern.     Long Term Goals: 8 weeks   Increased L knee AROM 0 to 120 deg.  I community ambulation  I negotiating stairs with alternating step pattern  Report decreased L knee pain < or =  1/10 with adls such as bending, extension, prolonged sitting, increased walking distances, and negotiating steps.  Increased MMT for B LE to 4/5 muscle grade to promote proper pelvic stability to decrease L knee pain < or =  1/10 with adls such as bending, extension, prolonged sitting, increased walking distances, and negotiating steps.  Increased flexibility in B hamstrings to -20 deg with 90/90 test to promote proper pelvic stability to decrease L knee pain < or =  1/10 with adls such as bending, extension, prolonged sitting, increased walking distances, and negotiating steps.   Patient to achieve CJ (at least 20% < 40% impaired, limited or restricted) level on the FOTO Outcomes Measurement System.    PLAN   Certification Period/Plan of care expiration: 3/28/2023 to 5/23/2023.     Improve L knee ROM and strength     Outpatient Physical Therapy 2 times weekly for 8 weeks to include the following interventions: Gait Training, Manual  Therapy, Moist Heat/ Ice, Neuromuscular Re-ed, Patient Education, Therapeutic Activities, and Therapeutic Exercise.     Rosmery Bailey III, PTA

## 2023-04-12 ENCOUNTER — CLINICAL SUPPORT (OUTPATIENT)
Dept: REHABILITATION | Facility: OTHER | Age: 63
End: 2023-04-12
Payer: COMMERCIAL

## 2023-04-12 DIAGNOSIS — Z74.09 IMPAIRED FUNCTIONAL MOBILITY, BALANCE, GAIT, AND ENDURANCE: ICD-10-CM

## 2023-04-12 DIAGNOSIS — M62.81 MUSCLE WEAKNESS OF LOWER EXTREMITY: ICD-10-CM

## 2023-04-12 DIAGNOSIS — M25.662 DECREASED RANGE OF MOTION OF LEFT KNEE: Primary | ICD-10-CM

## 2023-04-12 PROCEDURE — 97110 THERAPEUTIC EXERCISES: CPT | Mod: PN,CQ

## 2023-04-12 PROCEDURE — 97140 MANUAL THERAPY 1/> REGIONS: CPT | Mod: PN,CQ

## 2023-04-17 ENCOUNTER — CLINICAL SUPPORT (OUTPATIENT)
Dept: REHABILITATION | Facility: OTHER | Age: 63
End: 2023-04-17
Payer: COMMERCIAL

## 2023-04-17 DIAGNOSIS — M25.662 DECREASED RANGE OF MOTION OF LEFT KNEE: Primary | ICD-10-CM

## 2023-04-17 DIAGNOSIS — M62.81 MUSCLE WEAKNESS OF LOWER EXTREMITY: ICD-10-CM

## 2023-04-17 DIAGNOSIS — Z74.09 IMPAIRED FUNCTIONAL MOBILITY, BALANCE, GAIT, AND ENDURANCE: ICD-10-CM

## 2023-04-17 PROCEDURE — 97110 THERAPEUTIC EXERCISES: CPT | Mod: PN

## 2023-04-17 PROCEDURE — 97140 MANUAL THERAPY 1/> REGIONS: CPT | Mod: PN

## 2023-04-17 NOTE — PROGRESS NOTES
"[facility]   Physical Therapy Treatment Note     Name: Jen Alarcon  Clinic Number: [id number]    Therapy Diagnosis:   Encounter Diagnoses   Name Primary?    Decreased range of motion of left knee Yes    Muscle weakness of lower extremity     Impaired functional mobility, balance, gait, and endurance      Physician: Art Yuan MD    Visit Date: 2023    Physician Orders: PT Eval and Treat   Medical Diagnosis: Z96.652 (ICD-10-CM) - History of total knee replacement, left  Evaluation Date: 3/28/2023  Surgery Date: 3/13/2023  Authorization Period Expiration: 2023  Plan of Care Certification Period: 2023  Visit # / Visits authorized: 7 ()  FOTO:  (Last issued: 3/28/2023)  PTA Visit #:     Precautions: Standard    Time In: 12:15 pm  Time Out: 1:05 pm  Total Billable Time: 50 minutes    SUBJECTIVE   Pt reports: she feels good after her therapy sessions, but then her knee always stiffens back up and it is hard to exercise through.     She was compliant with home exercise program.  Response to previous treatment: "I felt good"  Functional change: None today    Pain: 3/10 stiffness  Location: L knee    OBJECTIVE     2023:  L knee AROM: 0-3-98* *indicates guarding/pain prior to endrange with empty EF  MMT:   Quads = 4-/5   HS = 4-/5   Hip flex = 4-/5   Hip abd = 4-/5   Hip ext = 4-  30" STS: 8x  5x STS: 20"  TU"  Girth (mid patella): R = 37 cm, L = 40.5 cm       TREATMENT     JEN received the bolded treatments listed below:      Patient received therapeutic exercises for 30 minutes for improved strength and AROM including:    Reassessment x 10'  Heavy edu on pain and edema mgmt, including use of ice/compression and continuation of HEP performed 3-5x/day to promote ROM, fluid reduction, pain modulation. Tubigrips (sizes F and G) given today, with edu on purpose/don/doff schedule. X 15' pt verbalized understanding. X 10'    Self STM with rolling pin to " "HS/ calves/ quads/ADD/ITB x 3'  Quad sets with towel under ANKLE 15 x 5"  SLR x 15  Prone TKE x 15 x 5"    Heel prop with 3# above and below knee x 3'  Prone hangs 2# x 5'  Heel slides x 10  10" hold   Hamstring stretch 3 x 30"  Gastroc stretch on incline 3 x 30"  LAQ 3 x 10 - with ball squeeze today  Seated w/ strap Soleus stretch 3 x 30"  SLR x 20  S/L hip abd x 15  TKE with thin red super band 2 x 10    Future visit:  Recumbent bike    Patient received manual therapeutic technique for 20 minutes for improved soft tissue and joint mobility including:  PA/AP Tibiofemoral mob - seated on EOM today  Tibfem joint distraction w/ and w/o oscillations - EOM   Overpressure into extension/flex      Patient received neuromuscular reeducation for 00 minutes for improved proprioception and balance including:        Patient received therapeutic activities for 00 minutes for improved tolerance to functional activities including:  Retro walking on TM x 5'  STS x 15    Patient received gait training for 00 minutes today that included:   Heel toe rocking in // 20 x   Toe off high knee as grzegorz in // 20 x     PATIENT EDUCATION AND HOME EXERCISES     Home Exercises Provided and Patient Education Provided     Education provided:   - Continuance of HEP, especially extension    Written Home Exercises Provided: Patient instructed to cont prior HEP. Exercises were reviewed and RANDY was able to demonstrate them prior to the end of the session.  RANDY demonstrated good  understanding of the education provided. See EMR under Patient Instructions for exercises provided during therapy sessions    ASSESSMENT     Weekly reassessment performed today as pt is 1 month post op. Pt presents with but but slow improvements in functional mobility, being able to ambulate independently with SPC and with good improvements in gait speed (see TUG) and functional strength (see STS tests). However, pt presents with marked joint effusion in involved LE. Heavy " edu on pain modulation and importance of ice, compression for fluid reduction to improve knee mobility and tolerance with ROM exercises. Sizes F and G tubigrip given today as pt was given none prior, for uniform compression throughout joint. Encouraged icing knee several times per day to reduce or prevent sx occurrence. Pt verbalized understanding.      RANDY Is progressing well towards her goals.   Pt prognosis is Good.     Pt will continue to benefit from skilled outpatient physical therapy to address the deficits listed in the problem list box on initial evaluation, provide pt/family education and to maximize pt's level of independence in the home and community environment.     Pt's spiritual, cultural and educational needs considered and pt agreeable to plan of care and goals.     Anticipated barriers to physical therapy: None    Goals:   Short Term Goals: 4 weeks   Independent with HEP.  Report decreased L knee pain < or =  2/10 with adls such as bending, extension, prolonged sitting, increased walking distances, and negotiating steps.  No extensor lag in L knee with straight leg raises.   Increase L knee extension to full extension  Pt demonstrating good, strong quad activation.  Increased L knee AROM flex to 110 deg.  I household ambulation.  Up/down stairs with mod I using alternating step pattern.     Long Term Goals: 8 weeks   Increased L knee AROM 0 to 120 deg.  I community ambulation  I negotiating stairs with alternating step pattern  Report decreased L knee pain < or =  1/10 with adls such as bending, extension, prolonged sitting, increased walking distances, and negotiating steps.  Increased MMT for B LE to 4/5 muscle grade to promote proper pelvic stability to decrease L knee pain < or =  1/10 with adls such as bending, extension, prolonged sitting, increased walking distances, and negotiating steps.  Increased flexibility in B hamstrings to -20 deg with 90/90 test to promote proper pelvic stability  to decrease L knee pain < or =  1/10 with adls such as bending, extension, prolonged sitting, increased walking distances, and negotiating steps.   Patient to achieve CJ (at least 20% < 40% impaired, limited or restricted) level on the FOTO Outcomes Measurement System.    PLAN   Certification Period/Plan of care expiration: 3/28/2023 to 5/23/2023.     Improve L knee ROM and strength     Outpatient Physical Therapy 2 times weekly for 8 weeks to include the following interventions: Gait Training, Manual Therapy, Moist Heat/ Ice, Neuromuscular Re-ed, Patient Education, Therapeutic Activities, and Therapeutic Exercise.     Mari Castañeda, PT

## 2023-04-18 ENCOUNTER — CLINICAL SUPPORT (OUTPATIENT)
Dept: REHABILITATION | Facility: OTHER | Age: 63
End: 2023-04-18
Payer: COMMERCIAL

## 2023-04-18 DIAGNOSIS — Z74.09 IMPAIRED FUNCTIONAL MOBILITY, BALANCE, GAIT, AND ENDURANCE: ICD-10-CM

## 2023-04-18 DIAGNOSIS — M62.81 MUSCLE WEAKNESS OF LOWER EXTREMITY: ICD-10-CM

## 2023-04-18 DIAGNOSIS — M25.662 DECREASED RANGE OF MOTION OF LEFT KNEE: Primary | ICD-10-CM

## 2023-04-18 PROCEDURE — 97140 MANUAL THERAPY 1/> REGIONS: CPT | Mod: PN,CQ

## 2023-04-18 PROCEDURE — 97110 THERAPEUTIC EXERCISES: CPT | Mod: PN,CQ

## 2023-04-20 ENCOUNTER — CLINICAL SUPPORT (OUTPATIENT)
Dept: REHABILITATION | Facility: OTHER | Age: 63
End: 2023-04-20
Payer: COMMERCIAL

## 2023-04-20 DIAGNOSIS — M25.662 DECREASED RANGE OF MOTION OF LEFT KNEE: Primary | ICD-10-CM

## 2023-04-20 DIAGNOSIS — Z74.09 IMPAIRED FUNCTIONAL MOBILITY, BALANCE, GAIT, AND ENDURANCE: ICD-10-CM

## 2023-04-20 DIAGNOSIS — M62.81 MUSCLE WEAKNESS OF LOWER EXTREMITY: ICD-10-CM

## 2023-04-20 PROCEDURE — 97140 MANUAL THERAPY 1/> REGIONS: CPT | Mod: PN

## 2023-04-20 PROCEDURE — 97530 THERAPEUTIC ACTIVITIES: CPT | Mod: PN

## 2023-04-20 PROCEDURE — 97110 THERAPEUTIC EXERCISES: CPT | Mod: PN

## 2023-04-20 NOTE — PATIENT INSTRUCTIONS
Single Leg Mini Squat        Standing on one leg, sit your buttocks back allowing the knee to bend as you do so. Be sure to keep your back straight and squeeze your glutes as you come back to a stand.    Perform near a counter top or chair for support.    Perform 2 sets of 10 reps.    Copyright © 2023 [employer]

## 2023-04-20 NOTE — PROGRESS NOTES
[facility]   Physical Therapy Treatment Note     Name: Jen Alarcon  Clinic Number: [id number]    Therapy Diagnosis:   Encounter Diagnoses   Name Primary?    Decreased range of motion of left knee Yes    Muscle weakness of lower extremity     Impaired functional mobility, balance, gait, and endurance      Physician: Art Yuan MD    Visit Date: 4/20/2023    Physician Orders: PT Eval and Treat   Medical Diagnosis: Z96.652 (ICD-10-CM) - History of total knee replacement, left  Evaluation Date: 3/28/2023  Surgery Date: 3/13/2023  Authorization Period Expiration: 12/31/2023  Plan of Care Certification Period: 5/23/2023  Visit # / Visits authorized: 9 (8/ 20)  FOTO: 1/ 5 (Last issued: 4/20/2023)  PTA Visit #: 0/ 5    Precautions: Standard    Time In: 7:00 am  Time Out: 8:02 am  Total Billable Time: 52 minutes    SUBJECTIVE   Pt reports: her knee is bending more so she is getting in positions that she couldn't get in before. Felt dizzy after performing lateral walking. States she did not eat anything before PT session. Also reported she took pain medication prior to PT today.    She was compliant with home exercise program.  Response to previous treatment: no adverse effects  Functional change: walking better. Does not use a.d. at home. Uses cane in the community so that she doesn't fall. Going up steps with alternating    Pain: 1/10 stiffness  Location: L knee    OBJECTIVE     CMS Impairment/Limitation/Restriction for FOTO Knee Survey    Therapist reviewed FOTO scores for Jen Alarcon on 4/20/2023.   FOTO documents entered into [employer] - see Media section.    Limitation Score: 44%  Category: Mobility    Current : CK = at least 40% but < 60% impaired, limited or restricted  Goal: CJ = at least 20% but < 40% impaired, limited or restricted     4/20/2023  L knee AROM flex 100, PROM: 104    4/18/2023:  L knee AROM: 0-3-97    4/17/2023:  L knee AROM: 0-3-98* *indicates guarding/pain prior  "to endrange with empty EF  MMT:   Quads = 4-/5   HS = 4-/5   Hip flex = 4-/5   Hip abd = 4-/5   Hip ext = 4-  30" STS: 8x  5x STS: 20"  TU"  Girth (mid patella): R = 37 cm, L = 40.5 cm       TREATMENT     RANDY received the bolded treatments listed below:      Patient received therapeutic exercises for 30 minutes for improved strength and AROM including:  Recumbent bike 5'    Gastroc stretch on incline x 90"  Soleus stretch on incline x 90"    TKE off 1/2 roll x 30 reps    Self STM with rolling pin to HS/ calves/ quads/ADD/ITB x 3'  Quad sets with towel under ANKLE 15 x 5"  SLR x 15  Prone TKE x 15 x 5"    Heel prop with 3# above and below knee x 3'  Prone hangs 1# x 5'  Heel slides x 10  10" hold   Hamstring stretch 3 x 30"  Gastroc stretch on incline 3 x 30"  LAQ 3 x 10 - with ball squeeze today  Seated w/ strap Soleus stretch 3 x 30"  SLR x 20  S/L hip abd x 15  TKE with thick red super band 2 x 10      Patient received manual therapeutic technique for 10 minutes for improved soft tissue and joint mobility including:  PA/AP Tibiofemoral mob - seated on EOM today  Tibfem joint distraction w/ and w/o oscillations - EOM   Overpressure into extension/flex  The stick stm to L quads   Tibial rotations with pt flexing and extending her knee      Patient received neuromuscular reeducation for 00 minutes for improved proprioception and balance including:        Patient received therapeutic activities for 17 minutes for improved tolerance to functional activities including:  Step up on 6" steps 2 x 10 reps  SL squat 2 x 10 reps  Lateral stepping with red ankle cuff 4 x 40 ft  Hesitation marching 2 x 40 ft    Retro walking on TM x 5'  STS x 15    Patient received gait training for 00 minutes today that included:   Heel toe rocking in // 20 x   Toe off high knee as grzegorz in // 20 x     PATIENT EDUCATION AND HOME EXERCISES     Home Exercises Provided and Patient Education Provided     Education provided:   - Continuance of " HEP, especially extension    Written Home Exercises Provided: Patient instructed to cont prior HEP. Exercises were reviewed and RANDY was able to demonstrate them prior to the end of the session.  RANDY demonstrated good  understanding of the education provided. See EMR under Patient Instructions for exercises provided during therapy sessions    ASSESSMENT   Full revolutions on the bike this morning. Experienced dizziness during session. Pt ate peppermint and felt better. Discussed using heating pad to hamstring area when she does prone hangs art home. Patient had difficulty isolating L quad. PT provided verbal and tactile sues for quad activation.    RANDY Is progressing well towards her goals.   Pt prognosis is Good.     Pt will continue to benefit from skilled outpatient physical therapy to address the deficits listed in the problem list box on initial evaluation, provide pt/family education and to maximize pt's level of independence in the home and community environment.     Pt's spiritual, cultural and educational needs considered and pt agreeable to plan of care and goals.     Anticipated barriers to physical therapy: None    Goals:   Short Term Goals: 4 weeks   Independent with HEP. (Ongoing)  Report decreased L knee pain < or =  2/10 with adls such as bending, extension, prolonged sitting, increased walking distances, and negotiating steps. (In progress)  No extensor lag in L knee with straight leg raises.   Increase L knee extension to full extension  Pt demonstrating good, strong quad activation.  Increased L knee AROM flex to 110 deg.  I household ambulation.  Up/down stairs with mod I using alternating step pattern.     Long Term Goals: 8 weeks   Increased L knee AROM 0 to 120 deg.  I community ambulation  I negotiating stairs with alternating step pattern  Report decreased L knee pain < or =  1/10 with adls such as bending, extension, prolonged sitting, increased walking distances, and negotiating  steps.  Increased MMT for B LE to 4/5 muscle grade to promote proper pelvic stability to decrease L knee pain < or =  1/10 with adls such as bending, extension, prolonged sitting, increased walking distances, and negotiating steps.  Increased flexibility in B hamstrings to -20 deg with 90/90 test to promote proper pelvic stability to decrease L knee pain < or =  1/10 with adls such as bending, extension, prolonged sitting, increased walking distances, and negotiating steps.   Patient to achieve CJ (at least 20% < 40% impaired, limited or restricted) level on the FOTO Outcomes Measurement System.    PLAN   Certification Period/Plan of care expiration: 3/28/2023 to 5/23/2023.     Improve L knee ROM and strength     Outpatient Physical Therapy 2 times weekly for 8 weeks to include the following interventions: Gait Training, Manual Therapy, Moist Heat/ Ice, Neuromuscular Re-ed, Patient Education, Therapeutic Activities, and Therapeutic Exercise.     Freddy Martinez, PT

## 2023-04-24 ENCOUNTER — CLINICAL SUPPORT (OUTPATIENT)
Dept: REHABILITATION | Facility: OTHER | Age: 63
End: 2023-04-24
Payer: COMMERCIAL

## 2023-04-24 DIAGNOSIS — Z74.09 IMPAIRED FUNCTIONAL MOBILITY, BALANCE, GAIT, AND ENDURANCE: ICD-10-CM

## 2023-04-24 DIAGNOSIS — M25.662 DECREASED RANGE OF MOTION OF LEFT KNEE: Primary | ICD-10-CM

## 2023-04-24 DIAGNOSIS — M62.81 MUSCLE WEAKNESS OF LOWER EXTREMITY: ICD-10-CM

## 2023-04-24 PROCEDURE — 97110 THERAPEUTIC EXERCISES: CPT | Mod: PN,CQ

## 2023-04-24 PROCEDURE — 97530 THERAPEUTIC ACTIVITIES: CPT | Mod: PN,CQ

## 2023-04-24 PROCEDURE — 97140 MANUAL THERAPY 1/> REGIONS: CPT | Mod: PN,CQ

## 2023-04-24 NOTE — PROGRESS NOTES
"[facility]   Physical Therapy Treatment Note     Name: Jen Alarcon  Clinic Number: [id number]    Therapy Diagnosis:   Encounter Diagnoses   Name Primary?    Decreased range of motion of left knee Yes    Muscle weakness of lower extremity     Impaired functional mobility, balance, gait, and endurance      Physician: Art Yuan MD    Visit Date: 4/24/2023    Physician Orders: PT Eval and Treat   Medical Diagnosis: Z96.652 (ICD-10-CM) - History of total knee replacement, left  Evaluation Date: 3/28/2023  Surgery Date: 3/13/2023  Authorization Period Expiration: 12/31/2023  Plan of Care Certification Period: 5/23/2023  Visit # / Visits authorized: 10(9/ 20)  FOTO: 2/ 5 (Last issued: 4/20/2023)  PTA Visit #: 1/ 5    Precautions: Standard    Time In: 1215  Time Out: 1300  Total Billable Time: 45 minutes    SUBJECTIVE   Pt states having no c/o pn in L knee currently.    She was compliant with home exercise program.  Response to previous treatment: no adverse effects  Functional change:   Pain: 1/10 stiffness  Location: L knee    OBJECTIVE     4/24/2023   AROM: 2 deg ext and 85 deg flexion   PROM:  90 deg flexion     TREATMENT     JEN received the bolded treatments listed below:      Patient received therapeutic exercises for 15 minutes for improved strength and AROM including:  Recumbent bike 6'  to increase rom    Gastroc stretch on incline x 90"  Soleus stretch on incline x 90"    TKE off 1/2 roll x 30 reps    Self STM with rolling pin to HS/ calves/ quads/ADD/ITB x 3'  Quad sets with towel under ANKLE 15 x 5"  SLR x 15  Prone TKE x 15 x 5"    Heel prop with 3# above and below knee x 3'  Prone hangs 1# x 5'  Heel slides x 10  10" hold   Hamstring stretch 3 x 30"  Gastroc stretch on incline 3 x 30"  LAQ 3 x 10 - with ball squeeze today  Seated w/ strap Soleus stretch 3 x 30"  SLR x 20  S/L hip abd x 15  TKE with thick red super band 2 x 10      Patient received manual therapeutic " "technique for 10 minutes for improved soft tissue and joint mobility including:  PA/AP Tibiofemoral mob - seated on EOM today  Tibfem joint distraction w/ and w/o oscillations - EOM   Overpressure into extension/flex  The stick stm to L quads   Tibial rotations with pt flexing and extending her knee      Patient received neuromuscular reeducation for 00 minutes for improved proprioception and balance including:        Patient received therapeutic activities for 20 minutes for improved tolerance to functional activities including:  Step up on 6" steps 2 x 10 reps  SL squat 2 x 10 reps  Lateral stepping with red ankle cuff 4 x 40 ft  Hesitation marching 2 x 40 ft    Retro walking on TM x 5'  STS x 15    Patient received gait training for 00 minutes today that included:   Heel toe rocking in // 20 x   Toe off high knee as grzegorz in // 20 x     PATIENT EDUCATION AND HOME EXERCISES     Home Exercises Provided and Patient Education Provided     Education provided:   - Pt edu on proper exercise technique.      Written Home Exercises Provided: Patient instructed to cont prior HEP. Exercises were reviewed and RANDY was able to demonstrate them prior to the end of the session.  RANDY demonstrated good  understanding of the education provided. See EMR under Patient Instructions for exercises provided during therapy sessions    ASSESSMENT   Pt grzegorz tx well w/ no increase in pn.  Pt demonstrated improved ROM and quad strength.  Pt cont to have an ext lag and quad/ glute weakness.      RANDY Is progressing well towards her goals.   Pt prognosis is Good.     Pt will continue to benefit from skilled outpatient physical therapy to address the deficits listed in the problem list box on initial evaluation, provide pt/family education and to maximize pt's level of independence in the home and community environment.     Pt's spiritual, cultural and educational needs considered and pt agreeable to plan of care and goals.     Anticipated " barriers to physical therapy: None    Goals:   Short Term Goals: 4 weeks   Independent with HEP. (Ongoing)  Report decreased L knee pain < or =  2/10 with adls such as bending, extension, prolonged sitting, increased walking distances, and negotiating steps. (In progress)  No extensor lag in L knee with straight leg raises.   Increase L knee extension to full extension  Pt demonstrating good, strong quad activation.  Increased L knee AROM flex to 110 deg.  I household ambulation.  Up/down stairs with mod I using alternating step pattern.     Long Term Goals: 8 weeks   Increased L knee AROM 0 to 120 deg.  I community ambulation  I negotiating stairs with alternating step pattern  Report decreased L knee pain < or =  1/10 with adls such as bending, extension, prolonged sitting, increased walking distances, and negotiating steps.  Increased MMT for B LE to 4/5 muscle grade to promote proper pelvic stability to decrease L knee pain < or =  1/10 with adls such as bending, extension, prolonged sitting, increased walking distances, and negotiating steps.  Increased flexibility in B hamstrings to -20 deg with 90/90 test to promote proper pelvic stability to decrease L knee pain < or =  1/10 with adls such as bending, extension, prolonged sitting, increased walking distances, and negotiating steps.   Patient to achieve CJ (at least 20% < 40% impaired, limited or restricted) level on the FOTO Outcomes Measurement System.    PLAN   Cont to progress towards goals set by PT.  Work to improve joint ROM and quad/ glute strength.      Connor Ledesma, PTA

## 2023-04-25 ENCOUNTER — CLINICAL SUPPORT (OUTPATIENT)
Dept: REHABILITATION | Facility: OTHER | Age: 63
End: 2023-04-25
Payer: COMMERCIAL

## 2023-04-25 DIAGNOSIS — Z74.09 IMPAIRED FUNCTIONAL MOBILITY, BALANCE, GAIT, AND ENDURANCE: ICD-10-CM

## 2023-04-25 DIAGNOSIS — M25.662 DECREASED RANGE OF MOTION OF LEFT KNEE: Primary | ICD-10-CM

## 2023-04-25 DIAGNOSIS — M62.81 MUSCLE WEAKNESS OF LOWER EXTREMITY: ICD-10-CM

## 2023-04-25 PROCEDURE — 97140 MANUAL THERAPY 1/> REGIONS: CPT | Mod: PN,CQ

## 2023-04-25 PROCEDURE — 97530 THERAPEUTIC ACTIVITIES: CPT | Mod: PN,CQ

## 2023-04-25 PROCEDURE — 97110 THERAPEUTIC EXERCISES: CPT | Mod: PN,CQ

## 2023-04-25 NOTE — PROGRESS NOTES
"[facility]   Physical Therapy Treatment Note     Name: Jen Alarcon  Clinic Number: [id number]    Therapy Diagnosis:   Encounter Diagnoses   Name Primary?    Decreased range of motion of left knee Yes    Muscle weakness of lower extremity     Impaired functional mobility, balance, gait, and endurance        Physician: Art Yuan MD    Visit Date: 4/25/2023    Physician Orders: PT Eval and Treat   Medical Diagnosis: Z96.652 (ICD-10-CM) - History of total knee replacement, left  Evaluation Date: 3/28/2023  Surgery Date: 3/13/2023  Authorization Period Expiration: 12/31/2023  Plan of Care Certification Period: 5/23/2023  Visit # / Visits authorized: 10(9/ 20)  FOTO: 2/ 5 (Last issued: 4/20/2023)  PTA Visit #: 1/ 5    Precautions: Standard    Time In: 8:30 am  Time Out: 9:35 am  Total Billable Time: 55 minutes    SUBJECTIVE   Pt reports: Knee is feeling a little stiff this morning    She was compliant with home exercise program.  Response to previous treatment: "It was fine"  Functional change: None today    Pain: 1/10 stiffness  Location: L knee    OBJECTIVE     4/24/2023   AROM: 3 deg ext and 100 deg flexion       4/24/2023   AROM: 2 deg ext and 85 deg flexion   PROM:  90 deg flexion     TREATMENT     JEN received the bolded treatments listed below:      Patient received therapeutic exercises for 25 minutes for improved strength and AROM including:  Recumbent bike 6'  to increase ROM (seat at 10)    Gastroc stretch on incline x 90"  Soleus stretch on incline x 90"    TKE off 1/2 roll x 30 reps    Self STM with rolling pin to HS/ calves/ quads/ADD/ITB x 3'  Quad sets with half foam under ankle 15 x 5"  SLR x 15  Prone TKE x 15 x 5"    Heel prop with 3# above and below knee x 3'  Prone hangs 1# x 5'  (Increase weight NV)  Heel slides x 20  10" hold   Hamstring stretch 3 x 30"  Gastroc stretch on incline 3 x 30"  LAQ 1# 3 x 10 - with ball squeeze today  Seated w/ strap Soleus stretch " "3 x 30"  SLR x 20  S/L hip abd x 15  TKE with thick red super band 2 x 10      Patient received manual therapeutic technique for 13 minutes for improved soft tissue and joint mobility including:  PA/AP Tibiofemoral mob - seated on EOM today  Tibfem joint distraction w/ and w/o oscillations - EOM   Overpressure into extension/flex  The stick stm to L quads   Tibial rotations with pt flexing and extending her knee      Patient received neuromuscular reeducation for 00 minutes for improved proprioception and balance including:        Patient received therapeutic activities for 17 minutes for improved tolerance to functional activities including:  Step up on 6" steps 2 x 10 reps (added TKE)  SL squat 2 x 10 reps  Lateral stepping with red ankle cuff 4 x 40 ft  Hesitation marching 2 x 40 ft    Retro walking on TM x 5'  STS x 15    Patient received gait training for 00 minutes today that included:   Heel toe rocking in // 20 x   Toe off high knee as grzegorz in // 20 x     PATIENT EDUCATION AND HOME EXERCISES     Home Exercises Provided and Patient Education Provided     Education provided:   - Continuance of HEP with emphasis on ext     Written Home Exercises Provided: Patient instructed to cont prior HEP. Exercises were reviewed and RANDY was able to demonstrate them prior to the end of the session.  RANDY demonstrated good  understanding of the education provided. See EMR under Patient Instructions for exercises provided during therapy sessions    ASSESSMENT   Pt completed treatment with pain during manual and overpressure. Performed TF distraction with good relief of knee pressure and was able to tolerate overpressure a little more after. Pt wasn't able to achieve extension range from previous session, but showed a significant improvement in flexion today.    RANDY Is progressing well towards her goals.   Pt prognosis is Good.     Pt will continue to benefit from skilled outpatient physical therapy to address the deficits " listed in the problem list box on initial evaluation, provide pt/family education and to maximize pt's level of independence in the home and community environment.     Pt's spiritual, cultural and educational needs considered and pt agreeable to plan of care and goals.     Anticipated barriers to physical therapy: None    Goals:   Short Term Goals: 4 weeks   Independent with HEP. (Ongoing)  Report decreased L knee pain < or =  2/10 with adls such as bending, extension, prolonged sitting, increased walking distances, and negotiating steps. (In progress)  No extensor lag in L knee with straight leg raises.   Increase L knee extension to full extension  Pt demonstrating good, strong quad activation.  Increased L knee AROM flex to 110 deg.  I household ambulation.  Up/down stairs with mod I using alternating step pattern.     Long Term Goals: 8 weeks   Increased L knee AROM 0 to 120 deg.  I community ambulation  I negotiating stairs with alternating step pattern  Report decreased L knee pain < or =  1/10 with adls such as bending, extension, prolonged sitting, increased walking distances, and negotiating steps.  Increased MMT for B LE to 4/5 muscle grade to promote proper pelvic stability to decrease L knee pain < or =  1/10 with adls such as bending, extension, prolonged sitting, increased walking distances, and negotiating steps.  Increased flexibility in B hamstrings to -20 deg with 90/90 test to promote proper pelvic stability to decrease L knee pain < or =  1/10 with adls such as bending, extension, prolonged sitting, increased walking distances, and negotiating steps.   Patient to achieve CJ (at least 20% < 40% impaired, limited or restricted) level on the F[employer] Outcomes Measurement System.    PLAN   Certification Period/Plan of care expiration: 3/28/2023 to 5/23/2023.    Cont to progress towards goals set by PT.  Work to improve joint ROM and quad/ glute strength.      Rosmery Bailey III, PTA

## 2023-04-27 ENCOUNTER — CLINICAL SUPPORT (OUTPATIENT)
Dept: REHABILITATION | Facility: OTHER | Age: 63
End: 2023-04-27
Payer: COMMERCIAL

## 2023-04-27 DIAGNOSIS — Z74.09 IMPAIRED FUNCTIONAL MOBILITY, BALANCE, GAIT, AND ENDURANCE: ICD-10-CM

## 2023-04-27 DIAGNOSIS — M25.662 DECREASED RANGE OF MOTION OF LEFT KNEE: Primary | ICD-10-CM

## 2023-04-27 DIAGNOSIS — M62.81 MUSCLE WEAKNESS OF LOWER EXTREMITY: ICD-10-CM

## 2023-04-27 PROCEDURE — 97110 THERAPEUTIC EXERCISES: CPT | Mod: PN

## 2023-04-27 PROCEDURE — 97140 MANUAL THERAPY 1/> REGIONS: CPT | Mod: PN

## 2023-04-27 PROCEDURE — 97530 THERAPEUTIC ACTIVITIES: CPT | Mod: PN

## 2023-04-27 NOTE — PATIENT INSTRUCTIONS
PRONE QUAD STRETCH WITH BELT OR STRAP        Start by lying on your stomach with a strap or 2 belts linked together and looped it around your affected side ankle.    Next, use the belt to pull the knee into a bent position allowing for a stretch as shown.     Hold for 2 minutes    Copyright © 2023 [employer]      ILIOTIBIAL BAND STRETCH WITH BELT - ITB        Loop a belt around your foot. While lying on your back and leg up in front of you and knee straight, bring your leg across midline for a gentle stretch felt along your outer thigh.    Hold for 2 minutes    Copyright © 2023 [employer]    HAMSTRING STRETCH WITH MULTI-LOOP STRAP        Lie on your back and place a stretching strap on your foot. Pull on the strap to assist in raising your leg up for a stretch to the back of your leg.     Keep your target leg straight to slightly bent the entire time.      Hold for 2 minutes    Copyright © 2023 [employer]    BALL TKE - TERMINAL KNEE EXTENSION        Start in a standing position with a ball behind your knee and against a wall. The knee should be partially.    Next, press the back of your knee against the ball while you try and straighten your knee.    Hold for 5 seconds. Perform 30 reps    Copyright © 2023 [employer]

## 2023-04-27 NOTE — PROGRESS NOTES
"[facility]   Physical Therapy Treatment Note     Name: Jen Alarcon  Clinic Number: [id number]    Therapy Diagnosis:   Encounter Diagnoses   Name Primary?    Decreased range of motion of left knee Yes    Muscle weakness of lower extremity     Impaired functional mobility, balance, gait, and endurance          Physician: Art Yuan MD    Visit Date: 4/27/2023    Physician Orders: PT Eval and Treat   Medical Diagnosis: Z96.652 (ICD-10-CM) - History of total knee replacement, left  Evaluation Date: 3/28/2023  Surgery Date: 3/13/2023  Authorization Period Expiration: 12/31/2023  Plan of Care Certification Period: 5/23/2023  Visit # / Visits authorized: 11 (10/ 20)  FOTO: 3/ 5 (Last issued: 4/20/2023)  PTA Visit #: 0/ 5    Precautions: Standard    Time In: 9:17 am  Time Out: 10:02 am  Total Billable Time: 39 minutes    SUBJECTIVE   Pt reports: her knee is "okay" this morning. States she iced her knee yesterday because she was having some swelling. States she will be traveling next week.    She was compliant with home exercise program.  Response to previous treatment: "It was fine"  Functional change: walking without her cane in her house.    Pain: 2/10 stiffness. 0/10 with pain  Location: L knee    OBJECTIVE     4/27/2023  AROM: 3 deg to 105 deg    4/24/2023   AROM: 3 deg ext and 100 deg flexion       4/24/2023   AROM: 2 deg ext and 85 deg flexion   PROM:  90 deg flexion     TREATMENT     JEN received the bolded treatments listed below:      Patient received therapeutic exercises for 25 minutes for improved strength and AROM including:  Recumbent bike 6'  to increase ROM (seat at 10)    +prone quad stretch x 2'  IT band stretch with strap 3 x 30"  Hamstring stretch with strap 3 x 30"  TKE against ball 30 x 5      Gastroc stretch on incline x 90"  Soleus stretch on incline x 90"    TKE off 1/2 roll x 30 reps    Self STM with rolling pin to HS/ calves/ quads/ADD/ITB x 3'  Quad sets " "with half foam under ankle 15 x 5"  SLR x 15  Prone TKE x 15 x 5"    Heel prop with 3# above and below knee x 3'  Prone hangs 1# x 5'  (Increase weight NV)  Heel slides x 20  10" hold   Hamstring stretch 3 x 30"  Gastroc stretch on incline 3 x 30"  LAQ 1# 3 x 10 - with ball squeeze today  Seated w/ strap Soleus stretch 3 x 30"  SLR x 20  S/L hip abd x 15  TKE with thick red super band 2 x 10      Patient received manual therapeutic technique for 10 minutes for improved soft tissue and joint mobility including:  PA/AP Tibiofemoral mob - seated on EOM today  Tibfem joint distraction w/ and w/o oscillations - EOM   Overpressure into extension/flex  The stick stm to L quads   Tibial rotations with pt flexing and extending her knee      Patient received neuromuscular reeducation for 00 minutes for improved proprioception and balance including:        Patient received therapeutic activities for 10 minutes for improved tolerance to functional activities including:  Step up on 6" steps 2 x 10 reps (added TKE)  SL squat 2 x 10 reps  Lateral stepping with red ankle cuff 4 x 40 ft  Hesitation marching 4 x 40 ft    Retro walking on TM x 5'  STS x 15 with 5#    Patient received gait training for 00 minutes today that included:   Heel toe rocking in // 20 x   Toe off high knee as grzegorz in // 20 x     PATIENT EDUCATION AND HOME EXERCISES     Home Exercises Provided and Patient Education Provided     Education provided:   Prone quad stretch with strap  IT band stretch with strap  Hamstring stretch with strap  TKE against ball    Written Home Exercises Provided: Patient instructed to cont prior HEP. Exercises were reviewed and RANDY was able to demonstrate them prior to the end of the session.  RANDY demonstrated good  understanding of the education provided. See EMR under Patient Instructions for exercises provided during therapy sessions    ASSESSMENT   Improved L knee flexion noted today. Discussed pt ambulating without a.d . " Verbal cues for increased L knee extension during stance phase with gait    RANDY Is progressing well towards her goals.   Pt prognosis is Good.     Pt will continue to benefit from skilled outpatient physical therapy to address the deficits listed in the problem list box on initial evaluation, provide pt/family education and to maximize pt's level of independence in the home and community environment.     Pt's spiritual, cultural and educational needs considered and pt agreeable to plan of care and goals.     Anticipated barriers to physical therapy: None    Goals:   Short Term Goals: 4 weeks   Independent with HEP. (Ongoing)  Report decreased L knee pain < or =  2/10 with adls such as bending, extension, prolonged sitting, increased walking distances, and negotiating steps. (In progress)  No extensor lag in L knee with straight leg raises.   Increase L knee extension to full extension  Pt demonstrating good, strong quad activation.  Increased L knee AROM flex to 110 deg.  I household ambulation.  Up/down stairs with mod I using alternating step pattern.     Long Term Goals: 8 weeks   Increased L knee AROM 0 to 120 deg.  I community ambulation  I negotiating stairs with alternating step pattern  Report decreased L knee pain < or =  1/10 with adls such as bending, extension, prolonged sitting, increased walking distances, and negotiating steps.  Increased MMT for B LE to 4/5 muscle grade to promote proper pelvic stability to decrease L knee pain < or =  1/10 with adls such as bending, extension, prolonged sitting, increased walking distances, and negotiating steps.  Increased flexibility in B hamstrings to -20 deg with 90/90 test to promote proper pelvic stability to decrease L knee pain < or =  1/10 with adls such as bending, extension, prolonged sitting, increased walking distances, and negotiating steps.   Patient to achieve CJ (at least 20% < 40% impaired, limited or restricted) level on the [employer] Outcomes  Measurement System.    PLAN   Certification Period/Plan of care expiration: 3/28/2023 to 5/23/2023.    Cont to progress towards goals set by PT.  Work to improve joint ROM and quad/ glute strength.      Freddy Martinez, PT

## 2023-05-08 ENCOUNTER — CLINICAL SUPPORT (OUTPATIENT)
Dept: REHABILITATION | Facility: OTHER | Age: 63
End: 2023-05-08
Payer: COMMERCIAL

## 2023-05-08 DIAGNOSIS — Z74.09 IMPAIRED FUNCTIONAL MOBILITY, BALANCE, GAIT, AND ENDURANCE: ICD-10-CM

## 2023-05-08 DIAGNOSIS — M25.662 DECREASED RANGE OF MOTION OF LEFT KNEE: Primary | ICD-10-CM

## 2023-05-08 DIAGNOSIS — M62.81 MUSCLE WEAKNESS OF LOWER EXTREMITY: ICD-10-CM

## 2023-05-08 PROCEDURE — 97530 THERAPEUTIC ACTIVITIES: CPT | Mod: PN,CQ

## 2023-05-08 PROCEDURE — 97140 MANUAL THERAPY 1/> REGIONS: CPT | Mod: PN,CQ

## 2023-05-08 PROCEDURE — 97110 THERAPEUTIC EXERCISES: CPT | Mod: PN,CQ

## 2023-05-08 NOTE — PROGRESS NOTES
"[facility]   Physical Therapy Treatment Note     Name: Jen Alarcon  Clinic Number: [id number]    Therapy Diagnosis:   Encounter Diagnoses   Name Primary?    Decreased range of motion of left knee Yes    Muscle weakness of lower extremity     Impaired functional mobility, balance, gait, and endurance          Physician: Art Yuan MD    Visit Date: 5/8/2023    Physician Orders: PT Eval and Treat   Medical Diagnosis: Z96.652 (ICD-10-CM) - History of total knee replacement, left  Evaluation Date: 3/28/2023  Surgery Date: 3/13/2023  Authorization Period Expiration: 12/31/2023  Plan of Care Certification Period: 5/23/2023  Visit # / Visits authorized: 12 (11/ 20)  FOTO: 3/ 5 (Last issued: 4/20/2023)  PTA Visit #: 1/ 5    Precautions: Standard    Time In: 0837  Time Out: 0917    Total Billable Time:  38 minutes    SUBJECTIVE   Pt states having increased stiffness in L knee and increased keloiding around her scar.  Pt mentioned being on vaction and walking long distances / sitting in a convention for several days.      She was somewhat compliant with home exercise program.  Response to previous treatment: no adverse effects  Functional change: no change reported   Pain: 2/10 stiffness. 0/10 with pain  Location: L knee    OBJECTIVE   Pt entered clinic with improved gait pattern and increased keloiding along L knee scar.      5/8/2023   AROM 8-90 deg   PROM 5-95 deg     4/27/2023  AROM: 3 deg to 105 deg    4/24/2023   AROM: 3 deg ext and 100 deg flexion       4/24/2023   AROM: 2 deg ext and 85 deg flexion   PROM:  90 deg flexion     TREATMENT     JEN received the bolded treatments listed below:      Patient received therapeutic exercises for 18 minutes for improved strength and AROM including:  Recumbent bike 6'  to increase ROM (seat at 10)  +prone quad stretch 3 x 30 sec   IT band stretch with strap 3 x 30"  Hamstring stretch with strap 3 x 30"  TKE against ball 30 x 3 sec hold " "      Gastroc stretch on incline x 90"  Soleus stretch on incline x 90"    TKE off 1/2 roll x 30 reps    Self STM with rolling pin to HS/ calves/ quads/ADD/ITB x 3'  Quad sets with half foam under ankle 15 x 5"  SLR x 15  Prone TKE x 15 x 5"    Heel prop with 3# above and below knee x 3'  Prone hangs 1# x 5'  (Increase weight NV)  Heel slides x 20  10" hold   Hamstring stretch 3 x 30"  Gastroc stretch on incline 3 x 30"  LAQ 1# 3 x 10 - with ball squeeze today  Seated w/ strap Soleus stretch 3 x 30"  SLR x 20  S/L hip abd x 15  TKE with thick red super band 2 x 10      Patient received manual therapeutic technique for 10 minutes for improved soft tissue and joint mobility including:  PA/AP Tibiofemoral mob - seated on EOM today  Tibfem joint distraction w/ and w/o oscillations - EOM   Overpressure into extension/flex  Patella mobes   The stick stm to L quads   Tibial rotations with pt flexing and extending her knee      Patient received neuromuscular reeducation for 00 minutes for improved proprioception and balance including:        Patient received therapeutic activities for 10 minutes for improved tolerance to functional activities including:  Step up on 6" steps 2 x 10 reps (added TKE)  SL squat 2 x 10 reps  Lateral stepping with green ankle cuff 42x 40 ft  Hesitation marching 4 x 40 ft    Retro walking on TM x 5'  STS x 15 with 5#    Patient received gait training for 00 minutes today that included:   Heel toe rocking in // 20 x   Toe off high knee as grzegorz in // 20 x     PATIENT EDUCATION AND HOME EXERCISES     Home Exercises Provided and Patient Education Provided     Education provided:   Pt edu on proper exercise and cross friction massage of L knee scar techniques.       Written Home Exercises Provided: Patient instructed to cont prior HEP. Exercises were reviewed and RANDY was able to demonstrate them prior to the end of the session.  RANDY demonstrated good  understanding of the education provided. See EMR " under Patient Instructions for exercises provided during therapy sessions    ASSESSMENT   Pt arrived 6 min late to tx today.  Pt grzegorz tx well w/ no increase in pn.  Pt cont to lack some L Knee flexion and extension.  Pt displayed endurance during tx.      RANDY Is progressing well towards her goals.   Pt prognosis is Good.     Pt will continue to benefit from skilled outpatient physical therapy to address the deficits listed in the problem list box on initial evaluation, provide pt/family education and to maximize pt's level of independence in the home and community environment.     Pt's spiritual, cultural and educational needs considered and pt agreeable to plan of care and goals.     Anticipated barriers to physical therapy: None    Goals:   Short Term Goals: 4 weeks   Independent with HEP. (Ongoing)  Report decreased L knee pain < or =  2/10 with adls such as bending, extension, prolonged sitting, increased walking distances, and negotiating steps. (In progress)  No extensor lag in L knee with straight leg raises.   Increase L knee extension to full extension  Pt demonstrating good, strong quad activation.  Increased L knee AROM flex to 110 deg.  I household ambulation.  Up/down stairs with mod I using alternating step pattern.     Long Term Goals: 8 weeks   Increased L knee AROM 0 to 120 deg.  I community ambulation  I negotiating stairs with alternating step pattern  Report decreased L knee pain < or =  1/10 with adls such as bending, extension, prolonged sitting, increased walking distances, and negotiating steps.  Increased MMT for B LE to 4/5 muscle grade to promote proper pelvic stability to decrease L knee pain < or =  1/10 with adls such as bending, extension, prolonged sitting, increased walking distances, and negotiating steps.  Increased flexibility in B hamstrings to -20 deg with 90/90 test to promote proper pelvic stability to decrease L knee pain < or =  1/10 with adls such as bending, extension,  prolonged sitting, increased walking distances, and negotiating steps.   Patient to achieve CJ (at least 20% < 40% impaired, limited or restricted) level on the [employer] Outcomes Measurement System.    PLAN   Cont to progress towards goals set by PT focusing on quad strength and knee ext/flexion.      Connor Ledesma, PTA

## 2023-05-09 ENCOUNTER — CLINICAL SUPPORT (OUTPATIENT)
Dept: REHABILITATION | Facility: OTHER | Age: 63
End: 2023-05-09
Payer: COMMERCIAL

## 2023-05-09 DIAGNOSIS — Z74.09 IMPAIRED FUNCTIONAL MOBILITY, BALANCE, GAIT, AND ENDURANCE: ICD-10-CM

## 2023-05-09 DIAGNOSIS — M62.81 MUSCLE WEAKNESS OF LOWER EXTREMITY: ICD-10-CM

## 2023-05-09 DIAGNOSIS — M25.662 DECREASED RANGE OF MOTION OF LEFT KNEE: Primary | ICD-10-CM

## 2023-05-09 PROCEDURE — 97110 THERAPEUTIC EXERCISES: CPT | Mod: PN

## 2023-05-09 PROCEDURE — 97530 THERAPEUTIC ACTIVITIES: CPT | Mod: PN

## 2023-05-09 PROCEDURE — 97140 MANUAL THERAPY 1/> REGIONS: CPT | Mod: PN

## 2023-05-09 NOTE — PROGRESS NOTES
"[facility]   Physical Therapy Treatment Note     Name: Jen Alarcon  Clinic Number: [id number]    Therapy Diagnosis:   Encounter Diagnoses   Name Primary?    Decreased range of motion of left knee Yes    Muscle weakness of lower extremity     Impaired functional mobility, balance, gait, and endurance        Physician: Art Yuan MD    Visit Date: 5/9/2023    Physician Orders: PT Eval and Treat   Medical Diagnosis: Z96.652 (ICD-10-CM) - History of total knee replacement, left  Evaluation Date: 3/28/2023  Surgery Date: 3/13/2023  Authorization Period Expiration: 12/31/2023  Plan of Care Certification Period: 5/23/2023  Visit # / Visits authorized: 13 (12/ 20)  FOTO: 2/ 5 (Last issued: 5/8/2023)  PTA Visit #: 0/5    Precautions: Standard    Time In: 7:50 am  Time Out: 8:36 am   Total Billable Time:  41 minutes    SUBJECTIVE   Pt denies pain this morning, but more stiffness. Slept with her leg straighter last night and that may be contributing to the stiffness.    She was somewhat compliant with home exercise program.  Response to previous treatment: no adverse effects  Functional change: walking downstairs is better    Pain: 2/10 stiffness. 0/10 with pain  Location: L knee    OBJECTIVE   Pt entered clinic with improved gait pattern and increased keloiding along L knee scar.      5/9/23    L knee AROM: 100 deg  L knee PROM; 107 deg    5/8/2023   AROM 8-90 deg   PROM 5-95 deg     4/27/2023  AROM: 3 deg to 105 deg    4/24/2023   AROM: 3 deg ext and 100 deg flexion       4/24/2023   AROM: 2 deg ext and 85 deg flexion   PROM:  90 deg flexion     TREATMENT     JEN received the bolded treatments listed below:      Patient received therapeutic exercises for 16 minutes for improved strength and AROM including:  Recumbent bike 5'  to increase ROM (seat at 10)  SLR with ER 2 x 10 reps  prone quad stretch 3 x 30 sec   IT band stretch with strap x 90"  Hamstring stretch with strap x 90"  TKE " "against ball 30 x 3 sec hold       Gastroc stretch with strap x 90"  L hip flexor stretch off EOM with strap x 90"  Soleus stretch on incline x 90"    TKE off 1/2 roll x 30 reps    Self STM with rolling pin to HS/ calves/ quads/ADD/ITB x 3'  Quad sets with half foam under ankle 15 x 5"  SLR x 15  Prone TKE x 15 x 5"    Heel prop with 3# above and below knee x 3'  Prone hangs 1# x 5'  (Increase weight NV)  Heel slides x 20  10" hold   LAQ 2# 3 x 10 - with ball squeeze today  Seated w/ strap Soleus stretch 3 x 30"  SLR x 20  S/L hip abd x 15  TKE with thick red super band 2 x 10      Patient received manual therapeutic technique for 10 minutes for improved soft tissue and joint mobility including:  PA/AP Tibiofemoral mob - seated on EOM today  Tibfem joint distraction w/ and w/o oscillations - EOM   Overpressure into extension/flex  Patella mobs   The stick stm to L quads   Tibial rotations with pt flexing and extending her knee      Patient received neuromuscular reeducation for 00 minutes for improved proprioception and balance including:        Patient received therapeutic activities for 10 minutes for improved tolerance to functional activities including:  Step up on 6" steps 2 x 10 reps (added TKE)  SL squat 2 x 10 reps  Lateral stepping with green ankle cuff 4 x 40 ft  Hesitation marching 4 x 40 ft    Retro walking on TM x 5'  STS x 15 with 5#    Patient received gait training for 00 minutes today that included:   Heel toe rocking in // 20 x   Toe off high knee as grzegorz in // 20 x     PATIENT EDUCATION AND HOME EXERCISES     Home Exercises Provided and Patient Education Provided     Education provided:   Pt edu on proper exercise and cross friction massage of L knee scar techniques.       Written Home Exercises Provided: Patient instructed to cont prior HEP. Exercises were reviewed and RANDY was able to demonstrate them prior to the end of the session.  RANDY demonstrated good  understanding of the education " provided. See EMR under Patient Instructions for exercises provided during therapy sessions    ASSESSMENT   Increased L knee AROM and PROM noted today. Full extension noted in long sitting. Pt still having difficulty isolating L quad activation from gluteal activation.    RANDY Is progressing well towards her goals.   Pt prognosis is Good.     Pt will continue to benefit from skilled outpatient physical therapy to address the deficits listed in the problem list box on initial evaluation, provide pt/family education and to maximize pt's level of independence in the home and community environment.     Pt's spiritual, cultural and educational needs considered and pt agreeable to plan of care and goals.     Anticipated barriers to physical therapy: None    Goals:   Short Term Goals: 4 weeks   Independent with HEP. (Ongoing)  Report decreased L knee pain < or =  2/10 with adls such as bending, extension, prolonged sitting, increased walking distances, and negotiating steps. (In progress)  No extensor lag in L knee with straight leg raises.   Increase L knee extension to full extension  Pt demonstrating good, strong quad activation.  Increased L knee AROM flex to 110 deg.  I household ambulation.  Up/down stairs with mod I using alternating step pattern.     Long Term Goals: 8 weeks   Increased L knee AROM 0 to 120 deg.  I community ambulation  I negotiating stairs with alternating step pattern  Report decreased L knee pain < or =  1/10 with adls such as bending, extension, prolonged sitting, increased walking distances, and negotiating steps.  Increased MMT for B LE to 4/5 muscle grade to promote proper pelvic stability to decrease L knee pain < or =  1/10 with adls such as bending, extension, prolonged sitting, increased walking distances, and negotiating steps.  Increased flexibility in B hamstrings to -20 deg with 90/90 test to promote proper pelvic stability to decrease L knee pain < or =  1/10 with adls such as  bending, extension, prolonged sitting, increased walking distances, and negotiating steps.   Patient to achieve CJ (at least 20% < 40% impaired, limited or restricted) level on the FOTO Outcomes Measurement System.    PLAN   Cont to progress towards goals set by PT focusing on quad strength and knee ext/flexion.      Freddy Martinez, PT

## 2023-05-09 NOTE — PATIENT INSTRUCTIONS
STRAIGHT LEG RAISE - SLR EXTERNAL ROTATION        While lying or sitting, raise up your leg with a straight knee and your toes pointed outward.    Perform 2 sets of 10 reps.    Copyright © 2023 [employer]

## 2023-05-09 NOTE — PROGRESS NOTES
[facility]   Physical Therapy Treatment Note     Name: Jen CORONA [facility] Number: [id number]    Therapy Diagnosis:   Encounter Diagnoses   Name Primary?    Decreased range of motion of left knee Yes    Muscle weakness of lower extremity     Impaired functional mobility, balance, gait, and endurance          Physician: Art uYan MD    Visit Date: 5/10/2023    Physician Orders: PT Eval and Treat   Medical Diagnosis: Z96.652 (ICD-10-CM) - History of total knee replacement, left  Evaluation Date: 3/28/2023  Surgery Date: 3/13/2023  Authorization Period Expiration: 12/31/2023  Plan of Care Certification Period: 5/23/2023  Visit # / Visits authorized: 14 (13/ 20)  FOTO: 3/ 5 (Last issued: 5/8/2023)  PTA Visit #: 0/5    Precautions: Standard    Time In: 7:02 am   Time Out: 8:15 am    Total Billable Time:  58 minutes    SUBJECTIVE   Pt reports: she is feeling okay today.  No pain but does report stiffness. Continues to lack knee RANGE OF MOTION.      She was somewhat compliant with home exercise program.  Response to previous treatment: no adverse effects  Functional change: walking downstairs is better    Pain: 0/10   Location: L knee    OBJECTIVE     5/10/2023  Left knee PROM post treatment: lacking 2 degrees extension - 108 degrees    TREATMENT     JEN received the bolded treatments listed below:      Patient received therapeutic exercises for 58 minutes for improved strength and AROM including:  Recumbent bike 8'  to increase ROM (seat at 10), level 1 for the first 2 minutes; level 2 for 6 minutes  Heel slides 10 second holds x3 minutes  Heel prop stretch with 3# weights above and below the knee x5 minutes  Long sitting gastroc stretch 3x 1 minute  Quad set with VMO ball squeeze 5 second holds x3 minutes  Prone knee hang with 3# weights x4 minutes  Prone quad stretch 3x 1 minute  Post treatment heel prop stretch with ice x10 minutes      SLR with ER 2 x 10 reps  prone quad  "stretch 3 x 30 sec   IT band stretch with strap x 90"  Hamstring stretch with strap x 90"  TKE against ball 30 x 3 sec hold       Gastroc stretch with strap x 90"  L hip flexor stretch off EOM with strap x 90"  Soleus stretch on incline x 90"    TKE off 1/2 roll x 30 reps    Self STM with rolling pin to HS/ calves/ quads/ADD/ITB x 3'  Quad sets with half foam under ankle 15 x 5"  SLR x 15  Prone TKE x 15 x 5"    Heel prop with 3# above and below knee x 3'  Prone hangs 1# x 5'  (Increase weight NV)  Heel slides x 20  10" hold   LAQ 2# 3 x 10 - with ball squeeze today  Seated w/ strap Soleus stretch 3 x 30"  SLR x 20  S/L hip abd x 15  TKE with thick red super band 2 x 10      Patient received manual therapeutic technique for 10 minutes for improved soft tissue and joint mobility including:  PA/AP Tibiofemoral mob - seated on EOM today  Tibfem joint distraction w/ and w/o oscillations - EOM   Overpressure into extension/flex  Superior and inferior patellar mobs  Tibial distraction  Seated knee flexion stretch    The stick stm to L quads   Tibial rotations with pt flexing and extending her knee      Patient received neuromuscular reeducation for 00 minutes for improved proprioception and balance including:        Patient received therapeutic activities for 00 minutes for improved tolerance to functional activities including:  Step up on 6" steps 2 x 10 reps (added TKE)  SL squat 2 x 10 reps  Lateral stepping with green ankle cuff 4 x 40 ft  Hesitation marching 4 x 40 ft    Retro walking on TM x 5'  STS x 15 with 5#    Patient received gait training for 00 minutes today that included:   Heel toe rocking in // 20 x   Toe off high knee as grzegorz in // 20 x     RANDY received cold pack for 10 minutes to left knee post treatment with heel prop stretch.     PATIENT EDUCATION AND HOME EXERCISES     Home Exercises Provided and Patient Education Provided     Education provided:   Performing HEP stretching and RANGE OF MOTION " exercises 3-5x/day     Written Home Exercises Provided: Patient instructed to cont prior HEP. Exercises were reviewed and RANDY was able to demonstrate them prior to the end of the session.  RANDY demonstrated good  understanding of the education provided. See EMR under Patient Instructions for exercises provided during therapy sessions    ASSESSMENT   Patient continues to lack AROM and PROM for both flexion and extension today although slight improvements noted post treatment today.  Heavy education regarding knee RANGE OF MOTION exercises at home at a frequency of 5x/day with prolonged holds as tolerated to maximize progress with RANGE OF MOTION.  Patient verbalized agreement and understanding.     RANDY Is progressing well towards her goals.   Pt prognosis is Good.     Pt will continue to benefit from skilled outpatient physical therapy to address the deficits listed in the problem list box on initial evaluation, provide pt/family education and to maximize pt's level of independence in the home and community environment.     Pt's spiritual, cultural and educational needs considered and pt agreeable to plan of care and goals.     Anticipated barriers to physical therapy: None    Goals:   Short Term Goals: 4 weeks   Independent with HEP. (Ongoing)  Report decreased L knee pain < or =  2/10 with adls such as bending, extension, prolonged sitting, increased walking distances, and negotiating steps. (In progress)  No extensor lag in L knee with straight leg raises.   Increase L knee extension to full extension  Pt demonstrating good, strong quad activation.  Increased L knee AROM flex to 110 deg.  I household ambulation.  Up/down stairs with mod I using alternating step pattern.     Long Term Goals: 8 weeks   Increased L knee AROM 0 to 120 deg.  I community ambulation  I negotiating stairs with alternating step pattern  Report decreased L knee pain < or =  1/10 with adls such as bending, extension, prolonged sitting,  increased walking distances, and negotiating steps.  Increased MMT for B LE to 4/5 muscle grade to promote proper pelvic stability to decrease L knee pain < or =  1/10 with adls such as bending, extension, prolonged sitting, increased walking distances, and negotiating steps.  Increased flexibility in B hamstrings to -20 deg with 90/90 test to promote proper pelvic stability to decrease L knee pain < or =  1/10 with adls such as bending, extension, prolonged sitting, increased walking distances, and negotiating steps.   Patient to achieve CJ (at least 20% < 40% impaired, limited or restricted) level on the [employer] Outcomes Measurement System.    PLAN   Cont to progress towards goals set by PT focusing on quad strength and knee ext/flexion.      Gifty Salguero, PT

## 2023-05-10 ENCOUNTER — CLINICAL SUPPORT (OUTPATIENT)
Dept: REHABILITATION | Facility: OTHER | Age: 63
End: 2023-05-10
Payer: COMMERCIAL

## 2023-05-10 DIAGNOSIS — Z74.09 IMPAIRED FUNCTIONAL MOBILITY, BALANCE, GAIT, AND ENDURANCE: ICD-10-CM

## 2023-05-10 DIAGNOSIS — M62.81 MUSCLE WEAKNESS OF LOWER EXTREMITY: ICD-10-CM

## 2023-05-10 DIAGNOSIS — M25.662 DECREASED RANGE OF MOTION OF LEFT KNEE: Primary | ICD-10-CM

## 2023-05-10 PROCEDURE — 97140 MANUAL THERAPY 1/> REGIONS: CPT | Mod: PN

## 2023-05-10 PROCEDURE — 97110 THERAPEUTIC EXERCISES: CPT | Mod: PN

## 2023-05-15 ENCOUNTER — CLINICAL SUPPORT (OUTPATIENT)
Dept: REHABILITATION | Facility: OTHER | Age: 63
End: 2023-05-15
Payer: COMMERCIAL

## 2023-05-15 DIAGNOSIS — Z74.09 IMPAIRED FUNCTIONAL MOBILITY, BALANCE, GAIT, AND ENDURANCE: ICD-10-CM

## 2023-05-15 DIAGNOSIS — M25.662 DECREASED RANGE OF MOTION OF LEFT KNEE: Primary | ICD-10-CM

## 2023-05-15 DIAGNOSIS — M62.81 MUSCLE WEAKNESS OF LOWER EXTREMITY: ICD-10-CM

## 2023-05-15 PROCEDURE — 97110 THERAPEUTIC EXERCISES: CPT | Mod: PN | Performed by: INTERNAL MEDICINE

## 2023-05-15 PROCEDURE — 97140 MANUAL THERAPY 1/> REGIONS: CPT | Mod: PN | Performed by: INTERNAL MEDICINE

## 2023-05-15 NOTE — PROGRESS NOTES
[facility]   Physical Therapy Treatment Note     Name: Jen CORONA [facility] Number: [id number]    Therapy Diagnosis:   Encounter Diagnoses   Name Primary?    Decreased range of motion of left knee Yes    Muscle weakness of lower extremity     Impaired functional mobility, balance, gait, and endurance          Physician: Art Yuan MD    Visit Date: 5/15/2023      Physician Orders: PT Eval and Treat   Medical Diagnosis: Z96.652 (ICD-10-CM) - History of total knee replacement, left  Evaluation Date: 3/28/2023  Surgery Date: 3/13/2023  Authorization Period Expiration: 12/31/2023  Plan of Care Certification Period: 5/23/2023  Visit # / Visits authorized: 15 (14/ 20)  FOTO: 3/ 5 (Last issued: 5/8/2023)  PTA Visit #: 0/5    Precautions: Standard    Time In: 1205pm   Time Out: 1 pm   Total Billable Time:  55minutes    SUBJECTIVE   Pt reports:  No pain but does report stiffness. She is worried about her decreased rom. Was not too compliant with HEP in the past.     She was somewhat compliant with home exercise program.  Response to previous treatment: no adverse effects  Functional change: walking downstairs is better    Pain: 0/10   Location: L knee    OBJECTIVE   Amb with decreased knee ext, I     5/15/2023  Left knee PROM post treatment: lacking 2 degrees extension - 108 degrees arom after jm, prom 110    TREATMENT     JEN received the bolded treatments listed below:      Patient received therapeutic exercises for 45 minutes for improved strength and AROM including:    Recumbent bike 5'  to increase ROM (seat at 10), level 1 for the first 2 minutes; level 2 for 6 minutes  Stair flex stretch 20 sec x 4   Prone quad stretch  2 min  minute strap   U HR 2 x 10   GSS slant board  90 sec   +Lat step ups 2 in 2 x 10 tap down heel with cues to avoid knee passing toes  Long sitting gastroc stretch 3x 1 minute  Quad set  towel roll heel 5 second holds x3 minutes  Prone knee hang  10 min   SAQ  "small bolster 30x  LAQ 30x  Post treatment heel prop stretch with ice x10 minutes      SLR with ER 2 x 10 reps  prone quad stretch 3 x 30 sec   IT band stretch with strap x 90"  Hamstring stretch with strap x 90"  TKE against ball 30 x 3 sec hold   Gastroc stretch with strap x 90"  L hip flexor stretch off EOM with strap x 90"  Soleus stretch on incline x 90"    TKE off 1/2 roll x 30 reps    Self STM with rolling pin to HS/ calves/ quads/ADD/ITB x 3'  Quad sets with half foam under ankle 15 x 5"  SLR x 15  Prone TKE x 15 x 5"            Patient received manual therapeutic technique for 10 minutes for improved soft tissue and joint mobility including:  PA/AP Tibiofemoral mob - seated on EOM today  Tibfem joint distraction w/ and w/o oscillations - EOM   Overpressure into extension/flex 20 sec x 3   Superior and inferior patellar mobs  Tibial MR gr 2-3 oscill  supine  Seated knee flexion stretch    Patient received neuromuscular reeducation for 00 minutes for improved proprioception and balance including:        Patient received therapeutic activities for 00 minutes for improved tolerance to functional activities including:  Step up on 6" steps 2 x 10 reps (added TKE)  SL squat 2 x 10 reps  Lateral stepping with green ankle cuff 4 x 40 ft  Hesitation marching 4 x 40 ft    Retro walking on TM x 5'  STS x 15 with 5#         RANDY received cold pack for 10 minutes to left knee post treatment with heel prop stretch.     PATIENT EDUCATION AND HOME EXERCISES     Home Exercises Provided and Patient Education Provided     Education provided:   Performing HEP stretching and RANGE OF MOTION exercises 3-5x/day . Importance of ROM ASAP due to rom getting harder to achieve after 6 weeks. Ed to ask MD about dynasplint possibly for extension.    Written Home Exercises Provided: updated hep today with ed to try prone hangs 10 min, 3x per day     Exercises were reviewed and RANDY was able to demonstrate them prior to the end of the " session.  RANDY demonstrated good  understanding of the education provided. See EMR under Patient Instructions for exercises provided during therapy sessions    ASSESSMENT   Improving with knee flex rom. Ed to focus on ext rom 3-4 x per day and increase prone hang holds to 10 min.     RANDY Is progressing   towards her goals.   Pt prognosis is Good.     Pt will continue to benefit from skilled outpatient physical therapy to address the deficits listed in the problem list box on initial evaluation, provide pt/family education and to maximize pt's level of independence in the home and community environment.     Pt's spiritual, cultural and educational needs considered and pt agreeable to plan of care and goals.     Anticipated barriers to physical therapy: None    Goals:   Short Term Goals: 4 weeks   Independent with HEP. (Ongoing)  Report decreased L knee pain < or =  2/10 with adls such as bending, extension, prolonged sitting, increased walking distances, and negotiating steps. (In progress)  Increased L knee AROM flex to 110 deg. MET for prom  I household ambulation.MET     Long Term Goals: 8 weeks   1  Increased L knee AROM 0 to 120 deg. Progressing, not met    2 I community ambulation with good knee ext  Progressing, not met  3 I negotiating stairs with alternating step pattern Progressing, not met  4 Report decreased L knee pain < or =  1/10 with adls such as bending, extension, prolonged sitting, increased walking distances, and negotiating steps.Progressing, not met  5 Increased MMT for B LE to 4/5 muscle grade to promote proper pelvic stability to decrease L knee pain < or =  1/10 with adls such as bending, extension, prolonged sitting, increased walking distances, and negotiating steps.Progressing, not met  6 Increased flexibility in B hamstrings to -20 deg with 90/90 test to promote proper pelvic stability to decrease L knee pain < or =  1/10 with adls such as bending, extension, prolonged sitting,  increased walking distances, and negotiating steps.  Progressing, not met  7 Patient to achieve CJ (at least 20% < 40% impaired, limited or restricted) level on the [employer] Outcomes Measurement System. Progressing, not met   8  . Knee ext to 0 rom Progressing, not met    PLAN   Cont to progress towards goals set by PT focusing on quad strength and knee ext/flexion.   Francheska Griggs, PT

## 2023-05-16 ENCOUNTER — EXTERNAL HOME HEALTH (OUTPATIENT)
Dept: HOME HEALTH SERVICES | Facility: HOSPITAL | Age: 63
End: 2023-05-16
Payer: COMMERCIAL

## 2023-05-16 ENCOUNTER — CLINICAL SUPPORT (OUTPATIENT)
Dept: REHABILITATION | Facility: OTHER | Age: 63
End: 2023-05-16
Payer: COMMERCIAL

## 2023-05-16 DIAGNOSIS — M62.81 MUSCLE WEAKNESS OF LOWER EXTREMITY: ICD-10-CM

## 2023-05-16 DIAGNOSIS — M25.662 DECREASED RANGE OF MOTION OF LEFT KNEE: Primary | ICD-10-CM

## 2023-05-16 DIAGNOSIS — Z74.09 IMPAIRED FUNCTIONAL MOBILITY, BALANCE, GAIT, AND ENDURANCE: ICD-10-CM

## 2023-05-16 PROCEDURE — 97110 THERAPEUTIC EXERCISES: CPT | Mod: PN,CQ

## 2023-05-16 NOTE — PROGRESS NOTES
"[facility]   Physical Therapy Treatment Note     Name: Jen Alarcon    Clinic Number: [id number]    Therapy Diagnosis:   Encounter Diagnoses   Name Primary?    Decreased range of motion of left knee Yes    Muscle weakness of lower extremity     Impaired functional mobility, balance, gait, and endurance      Physician: Art Yuan MD    Visit Date: 5/16/2023      Physician Orders: PT Eval and Treat   Medical Diagnosis: Z96.652 (ICD-10-CM) - History of total knee replacement, left  Evaluation Date: 3/28/2023  Surgery Date: 3/13/2023  Authorization Period Expiration: 12/31/2023  Plan of Care Certification Period: 5/23/2023  Visit # / Visits authorized: 17 (16/ 20)  FOTO: 4/ 5 (Last issued: 5/8/2023)    PTA Visit #: 1/ 5    Precautions: Standard    Time In: 8:32 am   Time Out: 9:15 am   Total Billable Time: 43 minutes    SUBJECTIVE   Pt reports:  Continues to have stiffness, but no pain    She was somewhat compliant with home exercise program.  Response to previous treatment: Didn't feel like she had to go home and lay down  Functional change: walking downstairs is better    Pain: 0/10   Location: L knee    OBJECTIVE   5/16/2023:  L knee AROM: 2 to 105 deg    5/15/2023  Left knee PROM post treatment: lacking 2 degrees extension - 108 degrees arom after jm, prom 110    TREATMENT     JEN received the bolded treatments listed below:      Patient received therapeutic exercises for 43 minutes for improved strength and AROM including:  Recumbent bike 5'  to increase ROM (seat at 9), level 1 for the first 2 minutes; level 2 for 6 minutes  Stair flex stretch 20 sec x 4   Prone quad stretch with strap 2 min  U HR 2 x 10   GSS slant board  90 sec   Later step ups on 2" step  2 x 10   Lateral heel tap downs  2 x 10      cues to avoid knee passing toes  Long sitting gastroc stretch 3x 1 minute  Quad set  towel roll heel 5 second holds x3 minutes  Prone knee hang x 10 min  (6 min with 1# " "today)  SAQ small bolster 30x  LAQ 30x      Post treatment heel prop stretch with ice x 10 minutes      SLR with ER 2 x 10 reps  prone quad stretch 3 x 30 sec   IT band stretch with strap x 90"  Hamstring stretch with strap x 90"  TKE against ball 30 x 3 sec hold   Gastroc stretch with strap x 90"  L hip flexor stretch off EOM with strap x 90"  Soleus stretch on incline x 90"    TKE off 1/2 roll x 30 reps    Self STM with rolling pin to HS/ calves/ quads/ADD/ITB x 3'  Quad sets with half foam under ankle 15 x 5"  SLR x 15  Prone TKE x 15 x 5"         Patient received manual therapeutic technique for 00 minutes for improved soft tissue and joint mobility including:  PA/AP Tibiofemoral mob - seated on EOM today  Tibfem joint distraction w/ and w/o oscillations - EOM   Overpressure into extension/flex 20 sec x 3   Superior and inferior patellar mobs  Tibial MR gr 2-3 oscill  supine  Seated knee flexion stretch    Patient received neuromuscular reeducation for 00 minutes for improved proprioception and balance including:        Patient received therapeutic activities for 00 minutes for improved tolerance to functional activities including:  Step up on 6" steps 2 x 10 reps (added TKE)  SL squat 2 x 10 reps  Lateral stepping with green ankle cuff 4 x 40 ft  Hesitation marching 4 x 40 ft    Retro walking on TM x 5'  STS x 15 with 5#      RANDY received cold pack for 00 minutes to left knee post treatment with heel prop stretch.     PATIENT EDUCATION AND HOME EXERCISES     Home Exercises Provided and Patient Education Provided     Education provided:   - Continuance of HEP    Written Home Exercises Provided: updated hep today with ed to try prone hangs 10 min, 3x per day     Exercises were reviewed and RANDY was able to demonstrate them prior to the end of the session.  RANDY demonstrated good  understanding of the education provided. See EMR under Patient Instructions for exercises provided during therapy " sessions    ASSESSMENT   Pt presents to clinic without AD and demonstrates an increased tiffanie and step length. Pt tolerated treatment well with some medial knee discomfort noted during knee extension. Pt continues to have limited knee ROM, was unable to achieve flexion ROM from previous visit.     RANDY Is progressing   towards her goals.   Pt prognosis is Good.     Pt will continue to benefit from skilled outpatient physical therapy to address the deficits listed in the problem list box on initial evaluation, provide pt/family education and to maximize pt's level of independence in the home and community environment.     Pt's spiritual, cultural and educational needs considered and pt agreeable to plan of care and goals.     Anticipated barriers to physical therapy: None    Goals:   Short Term Goals: 4 weeks   Independent with HEP. (Ongoing)  Report decreased L knee pain < or =  2/10 with adls such as bending, extension, prolonged sitting, increased walking distances, and negotiating steps. (In progress)  Increased L knee AROM flex to 110 deg. MET for prom  I household ambulation.MET     Long Term Goals: 8 weeks   1  Increased L knee AROM 0 to 120 deg. Progressing, not met    2 I community ambulation with good knee ext  Progressing, not met  3 I negotiating stairs with alternating step pattern Progressing, not met  4 Report decreased L knee pain < or =  1/10 with adls such as bending, extension, prolonged sitting, increased walking distances, and negotiating steps.Progressing, not met  5 Increased MMT for B LE to 4/5 muscle grade to promote proper pelvic stability to decrease L knee pain < or =  1/10 with adls such as bending, extension, prolonged sitting, increased walking distances, and negotiating steps.Progressing, not met  6 Increased flexibility in B hamstrings to -20 deg with 90/90 test to promote proper pelvic stability to decrease L knee pain < or =  1/10 with adls such as bending, extension, prolonged  sitting, increased walking distances, and negotiating steps.  Progressing, not met  7 Patient to achieve CJ (at least 20% < 40% impaired, limited or restricted) level on the FOTO Outcomes Measurement System. Progressing, not met   8  . Knee ext to 0 rom Progressing, not met    PLAN   Cont to progress towards goals set by PT focusing on quad strength and knee ext/flexion.     Delos Bailey III, PTA

## 2023-05-18 ENCOUNTER — CLINICAL SUPPORT (OUTPATIENT)
Dept: REHABILITATION | Facility: OTHER | Age: 63
End: 2023-05-18
Payer: COMMERCIAL

## 2023-05-18 DIAGNOSIS — M25.662 DECREASED RANGE OF MOTION OF LEFT KNEE: Primary | ICD-10-CM

## 2023-05-18 DIAGNOSIS — Z74.09 IMPAIRED FUNCTIONAL MOBILITY, BALANCE, GAIT, AND ENDURANCE: ICD-10-CM

## 2023-05-18 DIAGNOSIS — M62.81 MUSCLE WEAKNESS OF LOWER EXTREMITY: ICD-10-CM

## 2023-05-18 PROCEDURE — 97110 THERAPEUTIC EXERCISES: CPT | Mod: PN

## 2023-05-18 PROCEDURE — 97140 MANUAL THERAPY 1/> REGIONS: CPT | Mod: PN

## 2023-05-18 NOTE — PROGRESS NOTES
"[facility]   Physical Therapy Treatment Note     Name: Jen Alarcon    Clinic Number: [id number]    Therapy Diagnosis:   Encounter Diagnoses   Name Primary?    Decreased range of motion of left knee Yes    Muscle weakness of lower extremity     Impaired functional mobility, balance, gait, and endurance        Physician: Art Yuan MD    Visit Date: 5/18/2023      Physician Orders: PT Eval and Treat   Medical Diagnosis: Z96.652 (ICD-10-CM) - History of total knee replacement, left  Evaluation Date: 3/28/2023  Surgery Date: 3/13/2023  Authorization Period Expiration: 12/31/2023  Plan of Care Certification Period: 5/23/2023  Visit # / Visits authorized: 18 (17/ 20)  FOTO: 5/ 5 (Last issued: 5/8/2023)  PTA Visit #: / 5    Precautions: Standard    Time In: 8:35 am   Time Out: 9:15 am   Total Billable Time: 35 minutes    SUBJECTIVE   Pt denies pain in her L knee. Continues to report stiffness. States she contacted her MD's office about the splint. She is performing her prone hangs, tries to do for 10 minutes.    She was somewhat compliant with home exercise program.  Response to previous treatment: no adverse effects  Functional change: walking downstairs is better    Pain: 0/10   Location: L knee    OBJECTIVE       5/16/2023:  L knee AROM: 2 to 105 deg    5/15/2023  Left knee PROM post treatment: lacking 2 degrees extension - 108 degrees arom after jm, prom 110    TREATMENT     JEN received the bolded treatments listed below:      Patient received therapeutic exercises for 20 minutes for improved strength and AROM including:  Recumbent bike 5'  to increase ROM (seat at 9), level 2   Stair flex stretch 20 sec x 4   Prone quad stretch with strap 2 x 2 min  U HR 2 x 10   GSS slant board  90 sec   Lateral step ups on 2" step  2 x 10   Lateral heel tap downs  2 x 10 cues to avoid knee passing toes  Long sitting gastroc stretch 3x 1 minute  Quad set  towel roll heel 5 second holds x3 " "minutes  Prone knee hang x 10 min  (6 min with 1# today)    SAQ small bolster 3 x 10 reps with 4#  LAQ  x 10 reps with 4#  SLR with 2# 2 x 10 reps      Post treatment heel prop stretch with ice x 10 minutes      SLR with ER 2 x 10 reps  prone quad stretch 3 x 30 sec   IT band stretch with strap x 90"  Hamstring stretch with strap x 90"  TKE against ball 30 x 3 sec hold   Gastroc stretch with strap x 90"  L hip flexor stretch off EOM with strap x 90"  Soleus stretch on incline x 90"    TKE off 1/2 roll x 30 reps    Self STM with rolling pin to HS/ calves/ quads/ADD/ITB x 3'  Quad sets with half foam under ankle 15 x 5"  SLR x 15  Prone TKE x 15 x 5"         Patient received manual therapeutic technique for 15 minutes for improved soft tissue and joint mobility including:  Grades II, III, and IV femoral glides to increase extension  Tibfem joint distraction w/ and w/o oscillations - EOM   Overpressure into extension/flex 20 sec x 3   Superior and inferior patellar mobs  Tibial MR gr 2-3 oscill  supine  Seated knee flexion stretch    Patient received neuromuscular reeducation for 00 minutes for improved proprioception and balance including:        Patient received therapeutic activities for 5 minutes for improved tolerance to functional activities including:  Step up on 6" steps 2 x 10 reps with black monster band TKEs  Goblet squats 2 x 10 reps with 5#    SL squat 2 x 10 reps  Lateral stepping with green ankle cuff 4 x 40 ft  Hesitation marching 4 x 40 ft    Retro walking on TM x 5'  STS x 15 with 5#      RANDY received cold pack for 00 minutes to left knee post treatment with heel prop stretch.     PATIENT EDUCATION AND HOME EXERCISES     Home Exercises Provided and Patient Education Provided     Education provided:   - Continuance of HEP    Written Home Exercises Provided: updated hep today with ed to try prone hangs 10 min, 3x per day     Exercises were reviewed and RANDY was able to demonstrate them prior to the " end of the session.  RANDY demonstrated good  understanding of the education provided. See EMR under Patient Instructions for exercises provided during therapy sessions    ASSESSMENT   Experiencing tenderness and pain with patellar tendon mobilizations today    RANDY Is progressing   towards her goals.   Pt prognosis is Good.     Pt will continue to benefit from skilled outpatient physical therapy to address the deficits listed in the problem list box on initial evaluation, provide pt/family education and to maximize pt's level of independence in the home and community environment.     Pt's spiritual, cultural and educational needs considered and pt agreeable to plan of care and goals.     Anticipated barriers to physical therapy: None    Goals:   Short Term Goals: 4 weeks   Independent with HEP. (Ongoing)  Report decreased L knee pain < or =  2/10 with adls such as bending, extension, prolonged sitting, increased walking distances, and negotiating steps. (In progress)  Increased L knee AROM flex to 110 deg. MET for prom  I household ambulation.MET     Long Term Goals: 8 weeks   1  Increased L knee AROM 0 to 120 deg. Progressing, not met    2 I community ambulation with good knee ext  Progressing, not met  3 I negotiating stairs with alternating step pattern Progressing, not met  4 Report decreased L knee pain < or =  1/10 with adls such as bending, extension, prolonged sitting, increased walking distances, and negotiating steps.Progressing, not met  5 Increased MMT for B LE to 4/5 muscle grade to promote proper pelvic stability to decrease L knee pain < or =  1/10 with adls such as bending, extension, prolonged sitting, increased walking distances, and negotiating steps.Progressing, not met  6 Increased flexibility in B hamstrings to -20 deg with 90/90 test to promote proper pelvic stability to decrease L knee pain < or =  1/10 with adls such as bending, extension, prolonged sitting, increased walking distances,  and negotiating steps.  Progressing, not met  7 Patient to achieve CJ (at least 20% < 40% impaired, limited or restricted) level on the FOTO Outcomes Measurement System. Progressing, not met   8  . Knee ext to 0 rom Progressing, not met    PLAN   Cont to progress towards goals set by PT focusing on quad strength and knee ext/flexion.     Freddy Martinez, PT

## 2023-05-22 ENCOUNTER — CLINICAL SUPPORT (OUTPATIENT)
Dept: REHABILITATION | Facility: OTHER | Age: 63
End: 2023-05-22
Payer: COMMERCIAL

## 2023-05-22 DIAGNOSIS — M25.662 DECREASED RANGE OF MOTION OF LEFT KNEE: Primary | ICD-10-CM

## 2023-05-22 DIAGNOSIS — Z74.09 IMPAIRED FUNCTIONAL MOBILITY, BALANCE, GAIT, AND ENDURANCE: ICD-10-CM

## 2023-05-22 DIAGNOSIS — M62.81 MUSCLE WEAKNESS OF LOWER EXTREMITY: ICD-10-CM

## 2023-05-22 PROCEDURE — 97530 THERAPEUTIC ACTIVITIES: CPT | Mod: PN,CQ

## 2023-05-22 PROCEDURE — 97140 MANUAL THERAPY 1/> REGIONS: CPT | Mod: PN,CQ

## 2023-05-22 PROCEDURE — 97110 THERAPEUTIC EXERCISES: CPT | Mod: PN,CQ

## 2023-05-22 NOTE — PROGRESS NOTES
"[facility]   Physical Therapy Treatment Note     Name: Jen Alarcon    Clinic Number: [id number]    Therapy Diagnosis:   Encounter Diagnoses   Name Primary?    Decreased range of motion of left knee Yes    Muscle weakness of lower extremity     Impaired functional mobility, balance, gait, and endurance        Physician: Art Yuan MD    Visit Date: 5/22/2023      Physician Orders: PT Eval and Treat   Medical Diagnosis: Z96.652 (ICD-10-CM) - History of total knee replacement, left  Evaluation Date: 3/28/2023  Surgery Date: 3/13/2023  Authorization Period Expiration: 12/31/2023  Plan of Care Certification Period: 5/23/2023  Visit # / Visits authorized: 19 (18/ 20)  FOTO: 0/5  (5/22/2023)   PTA Visit #: 1 / 5    Precautions: Standard    Time In: 0834  Time Out: 0919  Total Billable Time: 45 minutes    SUBJECTIVE   Pt reports feeling well w/ no c/o pn in L knee. Pt mentioned she is performing more exercises than she has before.      She was somewhat compliant with home exercise program.  Response to previous treatment: no adverse effects  Functional change: able to walk down steps with greater ease    Pain: 0/10   Location: L knee    OBJECTIVE       5/22/2023: Prior to Tx  L knee AROM: 4 to 100 deg  L knee PROM:  0 to 105 deg     TREATMENT     JEN received the bolded treatments listed below:      Patient received therapeutic exercises for 22 minutes for improved strength and AROM including:  Recumbent bike 6'  to increase ROM (seat at 7-9), level 2   Stair flex stretch 20 sec x 4   Prone quad stretch with strap 2 x 2 min  U HR 2 x 10   GSS slant board  90 sec   Lateral step ups on 2" step  2 x 10   Lateral heel tap downs  2 x 10 cues to avoid knee passing toes  Long sitting gastroc stretch 3x 1 minute  Quad set  towel roll heel 5 second holds x3 minutes  Prone knee hang x 10 min  (6 min with 1# today)    SAQ small bolster 3 x 10 reps with 4#  LAQ  2 x 15 reps with 4#  Shuttle SL " "press L only 2 x 10 1 red, 1 black cord   SLR with 2# 2 x 10 reps      Post treatment heel prop stretch with ice x 10 minutes      SLR with ER 2 x 10 reps  prone quad stretch 3 x 30 sec   IT band stretch with strap x 90"  Hamstring stretch with strap x 90"  TKE against ball 30 x 3 sec hold   Gastroc stretch with strap x 90"  L hip flexor stretch off EOM with strap x 90"  Soleus stretch on incline x 90"    TKE off 1/2 roll x 30 reps    Self STM with rolling pin to HS/ calves/ quads/ADD/ITB x 3'  Quad sets with half foam under ankle 15 x 5"  SLR x 15  Prone TKE x 15 x 5"         Patient received manual therapeutic technique for 15 minutes for improved soft tissue and joint mobility including:  Grades II, III, and IV femoral glides to increase extension  Tibfem joint distraction w/ and w/o oscillations - EOM   Overpressure into extension/flex 20 sec x 3   Superior and inferior patellar mobs  Tibial MR gr 2-3 oscill  supine  Seated knee flexion stretch    Patient received neuromuscular reeducation for 00 minutes for improved proprioception and balance including:        Patient received therapeutic activities for 8 minutes for improved tolerance to functional activities including:  Step up on 6" steps 30 x reps with black monster band TKEs  (VCs needed to avoid hip hinge)  Goblet squats 2 x 10 reps with 5# (VCs needed to avoid wt shift)     SL squat 2 x 10 reps  Lateral stepping with green ankle cuff 4 x 40 ft  Hesitation marching 4 x 40 ft    Retro walking on TM x 5'  STS x 15 with 5#      RANDY received cold pack for 00 minutes to left knee post treatment with heel prop stretch.     PATIENT EDUCATION AND HOME EXERCISES     Home Exercises Provided and Patient Education Provided     Education provided:   - Pt edu on proper exercise technique.      Written Home Exercises Provided: updated hep today with ed to try prone hangs 10 min, 3x per day     Exercises were reviewed and RANDY was able to demonstrate them prior to the " end of the session.  RADNY demonstrated good  understanding of the education provided. See EMR under Patient Instructions for exercises provided during therapy sessions    ASSESSMENT   Pt demonstrated improved PROM knee ext and quad strength during tx today.  Pt cont to lack some L knee ROM and required some VCs to avoid wt shift with squats.  Pt grzegorz tx well w/ no c/o pn.      RANDY Is progressing   towards her goals.   Pt prognosis is Good.     Pt will continue to benefit from skilled outpatient physical therapy to address the deficits listed in the problem list box on initial evaluation, provide pt/family education and to maximize pt's level of independence in the home and community environment.     Pt's spiritual, cultural and educational needs considered and pt agreeable to plan of care and goals.     Anticipated barriers to physical therapy: None    Goals:   Short Term Goals: 4 weeks   Independent with HEP. (Ongoing)  Report decreased L knee pain < or =  2/10 with adls such as bending, extension, prolonged sitting, increased walking distances, and negotiating steps. (In progress)  Increased L knee AROM flex to 110 deg. MET for prom  I household ambulation.MET     Long Term Goals: 8 weeks   1  Increased L knee AROM 0 to 120 deg. Progressing, not met    2 I community ambulation with good knee ext  Progressing, not met  3 I negotiating stairs with alternating step pattern Progressing, not met  4 Report decreased L knee pain < or =  1/10 with adls such as bending, extension, prolonged sitting, increased walking distances, and negotiating steps.Progressing, not met  5 Increased MMT for B LE to 4/5 muscle grade to promote proper pelvic stability to decrease L knee pain < or =  1/10 with adls such as bending, extension, prolonged sitting, increased walking distances, and negotiating steps.Progressing, not met  6 Increased flexibility in B hamstrings to -20 deg with 90/90 test to promote proper pelvic stability to  decrease L knee pain < or =  1/10 with adls such as bending, extension, prolonged sitting, increased walking distances, and negotiating steps.  Progressing, not met  7 Patient to achieve CJ (at least 20% < 40% impaired, limited or restricted) level on the FOTO Outcomes Measurement System. Progressing, not met   8  . Knee ext to 0 rom Progressing, not met    PLAN   Cont to progress towards goals set by PT focusing on quad strength and knee ext/flexion.     Connor Ledesma, PTA

## 2023-05-23 ENCOUNTER — CLINICAL SUPPORT (OUTPATIENT)
Dept: REHABILITATION | Facility: OTHER | Age: 63
End: 2023-05-23
Payer: COMMERCIAL

## 2023-05-23 DIAGNOSIS — M62.81 MUSCLE WEAKNESS OF LOWER EXTREMITY: ICD-10-CM

## 2023-05-23 DIAGNOSIS — M25.662 DECREASED RANGE OF MOTION OF LEFT KNEE: Primary | ICD-10-CM

## 2023-05-23 DIAGNOSIS — Z74.09 IMPAIRED FUNCTIONAL MOBILITY, BALANCE, GAIT, AND ENDURANCE: ICD-10-CM

## 2023-05-23 PROCEDURE — 97140 MANUAL THERAPY 1/> REGIONS: CPT | Mod: PN

## 2023-05-23 PROCEDURE — 97530 THERAPEUTIC ACTIVITIES: CPT | Mod: PN

## 2023-05-23 PROCEDURE — 97110 THERAPEUTIC EXERCISES: CPT | Mod: PN

## 2023-05-23 NOTE — PLAN OF CARE
"[facility]  Physical Therapy Updated Plan of Care    Visit Date: 5/23/2023  Name: Jen Alarcon  Clinic Number: [id number]    Therapy Diagnosis:   Encounter Diagnoses   Name Primary?    Decreased range of motion of left knee Yes    Muscle weakness of lower extremity     Impaired functional mobility, balance, gait, and endurance      Physician: Art Yuan MD    Physician Orders: PT Eval and Treat   Medical Diagnosis: Z96.652 (ICD-10-CM) - History of total knee replacement, left  Evaluation Date: 3/28/2023  Surgery Date: 3/13/2023    Total Visits Received: 20  Cancelled Visits: 2  No Show Visits: 0    Current Certification Period:  3/28/2023 to 5/23/2023  Precautions:  Standard  Visits from Evaluation Date:  19  Functional Level Prior to Evaluation:  at evaluation: amb with straight cane. Not driving yet    Time In: 8:30 am  Time Out: 9:15 am  Total Billable Time: 40 minutes    Subjective     Update:     Pt denied pain this morning. Reporting stiffness in L knee. Increasing her L knee ROM and strengthening her L LE are her primary goals right now. The weakness and decreased ROM are limiting factors coming down the steps. Reports she has an appointment at 10:30 today with Dr. Yuan.    She was somewhat compliant with home exercise program.  Response to previous treatment: "It was a good session. A little tired."  Functional change: able to walk down steps with greater ease.    Pain: 0/10   Location: L knee      Objective     Update:     30 Seconds Sit to Stand:  15 reps    Timed Up and Go: 6 seconds    CMS Impairment/Limitation/Restriction for FOTO Knee Survey    Therapist reviewed FOTO scores for Jen Alarcon on 5/22/2023.   FOTO documents entered into [employer] - see Media section.    Limitation Score: 27%  Category: Mobility    Current : CJ = at least 20% but < 40% impaired, limited or restricted  Goal: CJ = at least 20% but < 40% impaired, limited or restricted         MMT R L " "  Hip flexion 5/5 5/5   Hip abduction 5/5 /5   Hip extension 5/5 5/5   Hip ER 5/5 /5   Hip IR 5/5 /5   Knee extension 5/5 5/5   Knee flexion 5/5 5/5   Ankle dorsiflexion 5/5 5/5   Ankle plantar flexion 5/5 5/5   Ankle inversion 5/5 5/5   Ankle eversion 5/5 5/5     Flexibility testing:  - hamstrings:            B: WNL  - gastrocnemius:      R tight, R: 7 deg, L: WNL  - piriformis:              B: tight, B: decreased 25%  - quadriceps:           B: tight, decreased 50%  - hip adductors:       B: WNL  - IT bands:              R: WNL, L: tight, decreased 25%    Joint Mobility  L knee AROM: 3 to 110 deg  L knee PROM:  1 to 115 deg       JEN received the bolded treatments listed below:      Patient received therapeutic exercises for 20 minutes for improved strength and AROM including:  Recumbent bike 5'  to increase ROM (seat at 7-9), level 2   Stair flex stretch 20 sec x 4   Shuttle DL 2 x 10 reps with 3 black bands, 1 red band 2 x 10 reps  Shuttle SL press L only 2 x 10 1 red, 1 black cord     Jen participated in dynamic functional therapeutic activities to improve functional performance for 10 minutes, including:  Forward step ups 2 x 10 reps with 6" step with black monster band TKE  Lateral step downs 2 x 10 reps 4" step    Jen received the following manual therapy techniques: Joint mobilizations were applied to the: L knee for 15 minutes, including:  Grades II, III, and IV femoral glides to increase extension  Overpressure into extension/flex  Superior and inferior patellar mobs  Tibial MR/ER gr 2-3 oscill with knee flex/ext  Seated knee flexion stretch        Assessment     Update:     JEN Is progressing  towards her goals.   Pt prognosis is Good.     Pt will continue to benefit from skilled outpatient physical therapy to address the deficits listed in the problem list box on initial evaluation, provide pt/family education and to maximize pt's level of independence in the home and community environment. "     Pt's spiritual, cultural and educational needs considered and pt agreeable to plan of care and goals.     Anticipated barriers to physical therapy: None    Previous Short Term Goals Status:     Short Term Goals: 4 weeks   Independent with HEP. (Ongoing)  Report decreased L knee pain < or =  2/10 with adls such as bending, extension, prolonged sitting, increased walking distances, and negotiating steps. (met)  Increased L knee AROM flex to 110 deg. MET   I household ambulation.MET     Long Term Goals: 8 weeks   1  Increased L knee AROM 0 to 120 deg. Progressing, not met  2 I community ambulation with good knee ext  met  3 I negotiating stairs with alternating step pattern met  4 Report decreased L knee pain < or =  1/10 with adls such as bending, extension, prolonged sitting, increased walking distances, and negotiating steps. met  5 Increased MMT for B LE to 4/5 muscle grade to promote proper pelvic stability to decrease L knee pain < or =  1/10 with adls such as bending, extension, prolonged sitting, increased walking distances, and negotiating steps.met  6 Increased flexibility in B hamstrings to -20 deg with 90/90 test to promote proper pelvic stability to decrease L knee pain < or =  1/10 with adls such as bending, extension, prolonged sitting, increased walking distances, and negotiating steps. met  7 Patient to achieve CJ (at least 20% < 40% impaired, limited or restricted) level on the FOTO Outcomes Measurement System.  met   8  . Knee ext to 0 rom Progressing, not met    Long Term Goal Status:   continue per initial plan of care.  Reasons for Recertification of Therapy:   Patient is progressing in OP PT, but still experiencing L knee tightness and limitations in AROM which affects functional activities and ADLs. Will benefit from OP PT for continued OP PT for manual therapy, quad strengthening, and L LE strengthening to maximize L knee AROM and function.    Plan     Updated Certification Period:  5/23/2023 to 7/18/2023  Recommended Treatment Plan: 2-3 times per week for 8 weeks: Gait Training, Manual Therapy, Moist Heat/ Ice, Neuromuscular Re-ed, Patient Education, Therapeutic Activities, and Therapeutic Exercise      Freddy Martinez, PT  5/23/2023      I CERTIFY THE NEED FOR THESE SERVICES FURNISHED UNDER THIS PLAN OF TREATMENT AND WHILE UNDER MY CARE    Physician's comments:        Physician's Signature: ___________________________________________________

## 2023-05-24 NOTE — PROGRESS NOTES
"[facility]   Physical Therapy Treatment Note     Name: Jen Alarcon    Clinic Number: [id number]    Therapy Diagnosis:   Encounter Diagnoses   Name Primary?    Decreased range of motion of left knee Yes    Muscle weakness of lower extremity     Impaired functional mobility, balance, gait, and endurance        Physician: Art Yuan MD    Visit Date: 5/24/2023      Physician Orders: PT Eval and Treat   Medical Diagnosis: Z96.652 (ICD-10-CM) - History of total knee replacement, left  Evaluation Date: 3/28/2023  Surgery Date: 3/13/2023  Authorization Period Expiration: 12/31/2023  Plan of Care Certification Period: 5/23/2023  Visit # / Visits authorized: 21 (20/ 40)  FOTO: 2/5  (5/22/2023)   PTA Visit #: 1 / 5    Precautions: Standard    Time In: 8:34 am  Time Out: 9:33 am  Total Billable Time: 55 minutes    SUBJECTIVE   Pt reports: "The incision is feeling tight"    She was somewhat compliant with home exercise program.  Response to previous treatment: "Everything was fine"  Functional change: able to walk down steps with greater ease.     Pain: 0/10   Location: L knee    OBJECTIVE     5/25/2023:  L knee AROM: 3 to 109 deg      5/23/2023:  30 Seconds Sit to Stand:  15 reps     Timed Up and Go: 6 seconds     CMS Impairment/Limitation/Restriction for FOTO Knee Survey     Therapist reviewed FOTO scores for Jen Alarcon on 5/22/2023.   FOTO documents entered into [employer] - see Media section.     Limitation Score: 27%  Category: Mobility     Current : CJ = at least 20% but < 40% impaired, limited or restricted  Goal: CJ = at least 20% but < 40% impaired, limited or restricted         MMT R L   Hip flexion 5/5 5/5   Hip abduction 5/5 */5   Hip extension 5/5 5/5   Hip ER 5/5 */5   Hip IR 5/5 */5   Knee extension 5/5 5/5   Knee flexion 5/5 5/5   Ankle dorsiflexion 5/5 5/5   Ankle plantar flexion 5/5 5/5   Ankle inversion 5/5 5/5   Ankle eversion 5/5 5/5      Flexibility testing:  - " "hamstrings:            B: WNL  - gastrocnemius:      R tight, R: 7 deg, L: WNL  - piriformis:              B: tight, B: decreased 25%  - quadriceps:           B: tight, decreased 50%  - hip adductors:       B: WNL  - IT bands:              R: WNL, L: tight, decreased 25%     Joint Mobility  L knee AROM: 3 to 110 deg  L knee PROM:  1 to 115 deg        TREATMENT     JEN received the bolded treatments listed below:      Jen received therapeutic exercises for 25 minutes for improved strength and AROM including:  Recumbent bike 5'  to increase ROM (seat at 7-9), level 2   Stair flex stretch 20 sec x 4   Shuttle DL 2 x 10 reps with 3 black bands, 1 red band 2 x 10 reps  Shuttle SL press 1 black cord 2 x 10  L only  Gastroc stretch on incline 3 x 30"  Prone knee hang 2# x 6'     Jen participated in dynamic functional therapeutic activities to improve functional performance for 15 minutes, including:  Forward step ups 2 x 10 reps with 6" step with black monster band TKE  Lateral step downs 2 x 10 reps 4" step  +Heel walking 30ft x 2     Jen received the following manual therapy techniques: Joint mobilizations were applied to the: L knee for 15 minutes, including:  Grades II, III, and IV femoral glides to increase extension  Overpressure into extension/flex  Superior and inferior patellar mobs  Tibial MR/ER gr 2-3 oscill with knee flex/ext  Seated knee flexion stretch      JEN received cold pack for 00 minutes to left knee post treatment with heel prop stretch.     PATIENT EDUCATION AND HOME EXERCISES     Home Exercises Provided and Patient Education Provided     Education provided:   - Continuance of HEP with an emphasis on extension     Written Home Exercises Provided: Patient instructed to cont prior HEP. Exercises were reviewed and JEN was able to demonstrate them prior to the end of the session.  JEN demonstrated good  understanding of the education provided. See EMR under Patient Instructions for " exercises provided during therapy sessions    ASSESSMENT   Continued with exercises and stretches to improve knee ROM and strengthening. Pt tolerated treatment well with increased tightness in hamstring. No overall improvement in knee ROM with measurements gathered at the end of session, but good quad fatigue noted.    RANDY Is progressing   towards her goals.   Pt prognosis is Good.     Pt will continue to benefit from skilled outpatient physical therapy to address the deficits listed in the problem list box on initial evaluation, provide pt/family education and to maximize pt's level of independence in the home and community environment.     Pt's spiritual, cultural and educational needs considered and pt agreeable to plan of care and goals.     Anticipated barriers to physical therapy: None      Previous Short Term Goals Status:     Short Term Goals: 4 weeks   Independent with HEP. (Ongoing)  Report decreased L knee pain < or =  2/10 with adls such as bending, extension, prolonged sitting, increased walking distances, and negotiating steps. (met)  Increased L knee AROM flex to 110 deg. MET   I household ambulation.MET     Long Term Goals: 8 weeks   1  Increased L knee AROM 0 to 120 deg. Progressing, not met  2 I community ambulation with good knee ext  met  3 I negotiating stairs with alternating step pattern met  4 Report decreased L knee pain < or =  1/10 with adls such as bending, extension, prolonged sitting, increased walking distances, and negotiating steps. met  5 Increased MMT for B LE to 4/5 muscle grade to promote proper pelvic stability to decrease L knee pain < or =  1/10 with adls such as bending, extension, prolonged sitting, increased walking distances, and negotiating steps.met  6 Increased flexibility in B hamstrings to -20 deg with 90/90 test to promote proper pelvic stability to decrease L knee pain < or =  1/10 with adls such as bending, extension, prolonged sitting, increased walking  distances, and negotiating steps. met  7 Patient to achieve CJ (at least 20% < 40% impaired, limited or restricted) level on the FOTO Outcomes Measurement System.  met   8  . Knee ext to 0 rom Progressing, not met     Long Term Goal Status:   continue per initial plan of care.  Reasons for Recertification of Therapy:   Patient is progressing in OP PT, but still experiencing L knee tightness and limitations in AROM which affects functional activities and ADLs. Will benefit from OP PT for continued OP PT for manual therapy, quad strengthening, and L LE strengthening to maximize L knee AROM and function.    PLAN   Updated Certification Period: 5/23/2023 to 7/18/2023    Cont to progress towards goals set by PT focusing on quad strength and knee ext/flexion.     Recommended Treatment Plan: 2-3 times per week for 8 weeks: Gait Training, Manual Therapy, Moist Heat/ Ice, Neuromuscular Re-ed, Patient Education, Therapeutic Activities, and Therapeutic Exercise       Rosmery Bailey III, PTA

## 2023-05-25 ENCOUNTER — CLINICAL SUPPORT (OUTPATIENT)
Dept: REHABILITATION | Facility: OTHER | Age: 63
End: 2023-05-25
Payer: COMMERCIAL

## 2023-05-25 DIAGNOSIS — Z74.09 IMPAIRED FUNCTIONAL MOBILITY, BALANCE, GAIT, AND ENDURANCE: ICD-10-CM

## 2023-05-25 DIAGNOSIS — M62.81 MUSCLE WEAKNESS OF LOWER EXTREMITY: ICD-10-CM

## 2023-05-25 DIAGNOSIS — M25.662 DECREASED RANGE OF MOTION OF LEFT KNEE: Primary | ICD-10-CM

## 2023-05-25 PROCEDURE — 97110 THERAPEUTIC EXERCISES: CPT | Mod: PN,CQ

## 2023-05-25 PROCEDURE — 97530 THERAPEUTIC ACTIVITIES: CPT | Mod: PN,CQ

## 2023-05-25 PROCEDURE — 97140 MANUAL THERAPY 1/> REGIONS: CPT | Mod: PN,CQ

## 2023-05-29 ENCOUNTER — CLINICAL SUPPORT (OUTPATIENT)
Dept: REHABILITATION | Facility: OTHER | Age: 63
End: 2023-05-29
Payer: COMMERCIAL

## 2023-05-29 DIAGNOSIS — M62.81 MUSCLE WEAKNESS OF LOWER EXTREMITY: ICD-10-CM

## 2023-05-29 DIAGNOSIS — Z74.09 IMPAIRED FUNCTIONAL MOBILITY, BALANCE, GAIT, AND ENDURANCE: ICD-10-CM

## 2023-05-29 DIAGNOSIS — M25.662 DECREASED RANGE OF MOTION OF LEFT KNEE: Primary | ICD-10-CM

## 2023-05-29 PROCEDURE — 97530 THERAPEUTIC ACTIVITIES: CPT | Mod: PN,CQ

## 2023-05-29 PROCEDURE — 97140 MANUAL THERAPY 1/> REGIONS: CPT | Mod: PN,CQ

## 2023-05-29 PROCEDURE — 97110 THERAPEUTIC EXERCISES: CPT | Mod: PN,CQ

## 2023-05-29 NOTE — PROGRESS NOTES
"[facility]   Physical Therapy Treatment Note     Name: Jen Alarcon    Clinic Number: [id number]    Therapy Diagnosis:   Encounter Diagnoses   Name Primary?    Decreased range of motion of left knee Yes    Muscle weakness of lower extremity     Impaired functional mobility, balance, gait, and endurance        Physician: Art Yuan MD    Visit Date: 5/29/2023      Physician Orders: PT Eval and Treat   Medical Diagnosis: Z96.652 (ICD-10-CM) - History of total knee replacement, left  Evaluation Date: 3/28/2023  Surgery Date: 3/13/2023  Authorization Period Expiration: 12/31/2023  Plan of Care Certification Period: 5/23/2023  Visit # / Visits authorized: 22 (21/ 40)  FOTO: 3/5  (5/22/2023)   PTA Visit #: 2 / 5    Precautions: Standard    Time In: 0827  Time Out: 0916  Total Billable Time:  49 minutes    SUBJECTIVE   Pt states feeling well w/ no c/o pn.      She was somewhat compliant with home exercise program.  Response to previous treatment: no adverse effects  Functional change: no change reported   Pain: 0/10   Location: L knee    OBJECTIVE     5/29/2023:  L knee AROM: 3 to 99 deg  L Knee PROM:  0-107 deg       TREATMENT     JEN received the bolded treatments listed below:      Jen received therapeutic exercises for 24 minutes for improved strength and AROM including:  Recumbent bike 6'  to increase ROM (seat at 7), level 2   Stair flex stretch 30 sec x 4   Gastroc stretch on incline 2 min   Treadmill backwards walking TKE .8 mph for 5 min   Shuttle DL 2 x 10 reps with 3 black bands, 1 red band 2 x 10 reps  Shuttle SL press 1 black cord 2 x 15  L only  Prone knee hang 2# x 6'     Jen participated in dynamic functional therapeutic activities to improve functional performance for 15 minutes, including:  Forward step ups 20 x reps with 6" step with black monster band TKE  Lateral step downs 2 x 10 reps 4" step  +Heel walking 60 ft x 2      Jen received the following manual " therapy techniques: Joint mobilizations were applied to the: L knee for 10 minutes, including:  Grades II, III, and IV femoral glides to increase extension  Overpressure into extension/flex  Superior and inferior patellar mobs    Tibial MR/ER gr 2-3 oscill with knee flex/ext  Seated knee flexion stretch      RANDY received cold pack for 00 minutes to left knee post treatment with heel prop stretch.     PATIENT EDUCATION AND HOME EXERCISES     Home Exercises Provided and Patient Education Provided     Education provided:   - Pt edu on proper exercise technique.      Written Home Exercises Provided: Patient instructed to cont prior HEP. Exercises were reviewed and RANDY was able to demonstrate them prior to the end of the session.  RANDY demonstrated good  understanding of the education provided. See EMR under Patient Instructions for exercises provided during therapy sessions    ASSESSMENT   Pt grzegorz tx well.  Pn level remained same prior to and after tx session.  Pt cont to lack some knee flexion/ ext.  Pt had some quad weakness w/ lateral step downs.  Pt showed improved strength and endurance during tx.      RANDY Is progressing   towards her goals.   Pt prognosis is Good.     Pt will continue to benefit from skilled outpatient physical therapy to address the deficits listed in the problem list box on initial evaluation, provide pt/family education and to maximize pt's level of independence in the home and community environment.     Pt's spiritual, cultural and educational needs considered and pt agreeable to plan of care and goals.     Anticipated barriers to physical therapy: None      Previous Short Term Goals Status:     Short Term Goals: 4 weeks   Independent with HEP. (Ongoing)  Report decreased L knee pain < or =  2/10 with adls such as bending, extension, prolonged sitting, increased walking distances, and negotiating steps. (met)  Increased L knee AROM flex to 110 deg. MET   I household ambulation.MET      Long Term Goals: 8 weeks   1  Increased L knee AROM 0 to 120 deg. Progressing, not met  2 I community ambulation with good knee ext  met  3 I negotiating stairs with alternating step pattern met  4 Report decreased L knee pain < or =  1/10 with adls such as bending, extension, prolonged sitting, increased walking distances, and negotiating steps. met  5 Increased MMT for B LE to 4/5 muscle grade to promote proper pelvic stability to decrease L knee pain < or =  1/10 with adls such as bending, extension, prolonged sitting, increased walking distances, and negotiating steps.met  6 Increased flexibility in B hamstrings to -20 deg with 90/90 test to promote proper pelvic stability to decrease L knee pain < or =  1/10 with adls such as bending, extension, prolonged sitting, increased walking distances, and negotiating steps. met  7 Patient to achieve CJ (at least 20% < 40% impaired, limited or restricted) level on the FOTO Outcomes Measurement System.  met   8  . Knee ext to 0 rom Progressing, not met     Long Term Goal Status:   continue per initial plan of care.  Reasons for Recertification of Therapy:   Patient is progressing in OP PT, but still experiencing L knee tightness and limitations in AROM which affects functional activities and ADLs. Will benefit from OP PT for continued OP PT for manual therapy, quad strengthening, and L LE strengthening to maximize L knee AROM and function.    PLAN   Cont to progress towards goals set by PT.  Work to increase L knee ROM and quad strength next visit.         Connor Ledesma, PTA

## 2023-05-30 ENCOUNTER — CLINICAL SUPPORT (OUTPATIENT)
Dept: REHABILITATION | Facility: OTHER | Age: 63
End: 2023-05-30
Payer: COMMERCIAL

## 2023-05-30 DIAGNOSIS — Z74.09 IMPAIRED FUNCTIONAL MOBILITY, BALANCE, GAIT, AND ENDURANCE: ICD-10-CM

## 2023-05-30 DIAGNOSIS — M62.81 MUSCLE WEAKNESS OF LOWER EXTREMITY: ICD-10-CM

## 2023-05-30 DIAGNOSIS — M25.662 DECREASED RANGE OF MOTION OF LEFT KNEE: Primary | ICD-10-CM

## 2023-05-30 PROCEDURE — 97530 THERAPEUTIC ACTIVITIES: CPT | Mod: PN

## 2023-05-30 PROCEDURE — 97110 THERAPEUTIC EXERCISES: CPT | Mod: PN

## 2023-05-30 NOTE — PROGRESS NOTES
"[facility]   Physical Therapy Treatment Note     Name: Jen Alarcon    Clinic Number: [id number]    Therapy Diagnosis:   Encounter Diagnoses   Name Primary?    Decreased range of motion of left knee Yes    Muscle weakness of lower extremity     Impaired functional mobility, balance, gait, and endurance        Physician: Art Yuan MD    Visit Date: 5/30/2023      Physician Orders: PT Eval and Treat   Medical Diagnosis: Z96.652 (ICD-10-CM) - History of total knee replacement, left  Evaluation Date: 3/28/2023  Surgery Date: 3/13/2023  Authorization Period Expiration: 12/31/2023  Plan of Care Certification Period: 7/18/23  Visit # / Visits authorized: 23 (22/ 40)  FOTO: 4/5  (5/22/2023)   PTA Visit #: 0/ 5    Precautions: Standard    Time In: 8:36 am  Time Out: 9:20 am  Total Billable Time:  38 minutes    SUBJECTIVE   Pt states feeling well w/ no c/o pn.      She was somewhat compliant with home exercise program.  Response to previous treatment: pain last evening  Functional change: no change reported     Pain: 1/10   Location: L knee    OBJECTIVE     5/29/2023:  L knee AROM: 3 to 99 deg  L Knee PROM:  0-107 deg       TREATMENT     JEN received the bolded treatments listed below:      Jen received therapeutic exercises for 20 minutes for improved strength and AROM including:  Recumbent bike 6'  to increase ROM (seat at 7), level 2   Stair flex stretch 30 sec x 4   Gastroc stretch on incline x 90"  Soleus stretch on incline x 90"  Treadmill backwards walking TKE .8 mph for 5 min   Shuttle DL 2 x 10 reps with 3 black bands, 1 red band 2 x 10 reps  Shuttle SL press 1 black cord, 1 red cord 2 x 15  L only  Prone knee hang 2# x 6'  Standing TKEs against ball 20 x 5"     Jen participated in dynamic functional therapeutic activities to improve functional performance for 24 minutes, including:  Forward step ups 20 x reps with 6" step with black monster band TKE  Lateral step downs 2 x " "10 reps 4" step  Heel walking 60 ft x 2   Goblet squats 2 x 10 reps with 3#, black monster band L knee  Lateral stepping with GTB 4 x 30 ft  Hesitation march 4 x 30 with 5#      Jen received the following manual therapy techniques: Joint mobilizations were applied to the: L knee for 00 minutes, including:  Grades II, III, and IV femoral glides to increase extension  Overpressure into extension/flex  Superior and inferior patellar mobs    Tibial MR/ER gr 2-3 oscill with knee flex/ext  Seated knee flexion stretch      JEN received cold pack for 00 minutes to left knee post treatment with heel prop stretch.     PATIENT EDUCATION AND HOME EXERCISES     Home Exercises Provided and Patient Education Provided     Education provided:   - Pt edu on proper exercise technique.      Written Home Exercises Provided: Patient instructed to cont prior HEP. Exercises were reviewed and JEN was able to demonstrate them prior to the end of the session.  JEN demonstrated good  understanding of the education provided. See EMR under Patient Instructions for exercises provided during therapy sessions    ASSESSMENT   Continued with functional activity training for L quad strengthening and hip strengthening to promote increased ROM in L knee and improved function with functional activities. Utilized black monster band with goblet squats to eliminate genus varum moment at L knee with squats. Tightness in heel cords noted and pt moving onto toes.    JEN Is progressing   towards her goals.   Pt prognosis is Good.     Pt will continue to benefit from skilled outpatient physical therapy to address the deficits listed in the problem list box on initial evaluation, provide pt/family education and to maximize pt's level of independence in the home and community environment.     Pt's spiritual, cultural and educational needs considered and pt agreeable to plan of care and goals.     Anticipated barriers to physical therapy: " None      Previous Short Term Goals Status:     Short Term Goals: 4 weeks   Independent with HEP. (Ongoing)  Report decreased L knee pain < or =  2/10 with adls such as bending, extension, prolonged sitting, increased walking distances, and negotiating steps. (met)  Increased L knee AROM flex to 110 deg. MET   I household ambulation.MET     Long Term Goals: 8 weeks   1  Increased L knee AROM 0 to 120 deg. Progressing, not met  2 I community ambulation with good knee ext  met  3 I negotiating stairs with alternating step pattern met  4 Report decreased L knee pain < or =  1/10 with adls such as bending, extension, prolonged sitting, increased walking distances, and negotiating steps. met  5 Increased MMT for B LE to 4/5 muscle grade to promote proper pelvic stability to decrease L knee pain < or =  1/10 with adls such as bending, extension, prolonged sitting, increased walking distances, and negotiating steps.met  6 Increased flexibility in B hamstrings to -20 deg with 90/90 test to promote proper pelvic stability to decrease L knee pain < or =  1/10 with adls such as bending, extension, prolonged sitting, increased walking distances, and negotiating steps. met  7 Patient to achieve CJ (at least 20% < 40% impaired, limited or restricted) level on the FOTO Outcomes Measurement System.  met   8  . Knee ext to 0 rom Progressing, not met     Long Term Goal Status:   continue per initial plan of care.  Reasons for Recertification of Therapy:   Patient is progressing in OP PT, but still experiencing L knee tightness and limitations in AROM which affects functional activities and ADLs. Will benefit from OP PT for continued OP PT for manual therapy, quad strengthening, and L LE strengthening to maximize L knee AROM and function.    PLAN   Cont to progress towards goals set by PT.  Work to increase L knee ROM and quad strength next visit.         Freddy Martinez, PT

## 2023-05-31 NOTE — PROGRESS NOTES
"[facility]   Physical Therapy Treatment Note     Name: Jen Alarcon    Clinic Number: [id number]    Therapy Diagnosis:   Encounter Diagnoses   Name Primary?    Decreased range of motion of left knee Yes    Muscle weakness of lower extremity     Impaired functional mobility, balance, gait, and endurance          Physician: Art Yuan MD    Visit Date: 6/1/2023      Physician Orders: PT Eval and Treat   Medical Diagnosis: Z96.652 (ICD-10-CM) - History of total knee replacement, left  Evaluation Date: 3/28/2023  Surgery Date: 3/13/2023  Authorization Period Expiration: 12/31/2023  Plan of Care Certification Period: 7/18/23  Visit # / Visits authorized: 24 (23/ 40)  FOTO: 5/5  (5/22/2023)   PTA Visit #: 0/ 5    Precautions: Standard    Time In: 8:38 am  Time Out: 9:21 am  Total Billable Time: 43 minutes    SUBJECTIVE   Pt states she is able to lift her leg higher.     She was somewhat compliant with home exercise program.  Response to previous treatment: good, no adverse effects  Functional change: walking down stairs has gotten better     Pain: 0/10   Location: L knee    OBJECTIVE     6/1/2023:  L knee AROM: 3 to 110 deg  L Knee PROM:  0-107 deg       TREATMENT     JEN received the bolded treatments listed below:      Jen received therapeutic exercises for 20 minutes for improved strength and AROM including:  Recumbent bike 6'  to increase ROM (seat at 7), level 2   Stair flex stretch 30 sec x 4   Gastroc stretch on incline x 90"  Soleus stretch on incline x 90"  Treadmill backwards walking TKE .8 mph for 5 min   Shuttle DL 2 x 10 reps with 3 black bands, 1 red band 2 x 10 reps  Shuttle SL press 1 black cord, 1 red cord 2 x 15  L only  Prone knee hang 2# x 6'  Standing TKEs against ball 20 x 5"  Prone quad stretch x 90"  Prone hamstring curls 2 x 10 reps     Jen participated in dynamic functional therapeutic activities to improve functional performance for 20 minutes, " "including:  Forward step ups 20 x reps with 6" step with black monster band TKE  Lateral step downs 2 x 10 reps 4" step    Heel walking 60 ft x 2   Goblet squats 2 x 10 reps with 3#, black monster band L knee  Lateral stepping with GTB 4 x 30 ft  Hesitation march 4 x 30 with 5#      Jen received the following manual therapy techniques: Joint mobilizations were applied to the: L knee for 8 minutes, including:  Grades II, III, and IV tibial glides to increase flexion in sitting  Tibial rotation with flex/ext  Overpressure into extension/flex  Superior and inferior patellar mobs    Tibial MR/ER gr 2-3 oscill with knee flex/ext  Seated knee flexion stretch      JEN received cold pack for 00 minutes to left knee post treatment with heel prop stretch.     PATIENT EDUCATION AND HOME EXERCISES     Home Exercises Provided and Patient Education Provided     Education provided:   - Pt edu on proper exercise technique.      Written Home Exercises Provided: Patient instructed to cont prior HEP. Exercises were reviewed and JEN was able to demonstrate them prior to the end of the session.  JEN demonstrated good  understanding of the education provided. See EMR under Patient Instructions for exercises provided during therapy sessions    ASSESSMENT   Continued with functional activity training for L quad strengthening and hip strengthening to promote increased ROM in L knee and improved function with functional activities. Utilized black monster band with goblet squats to eliminate genus varum moment at L knee with squats. Tightness in heel cords noted and pt moving onto toes.    JEN Is progressing   towards her goals.   Pt prognosis is Good.     Pt will continue to benefit from skilled outpatient physical therapy to address the deficits listed in the problem list box on initial evaluation, provide pt/family education and to maximize pt's level of independence in the home and community environment.     Pt's spiritual, " cultural and educational needs considered and pt agreeable to plan of care and goals.     Anticipated barriers to physical therapy: None      Previous Short Term Goals Status:     Short Term Goals: 4 weeks   Independent with HEP. (Ongoing)  Report decreased L knee pain < or =  2/10 with adls such as bending, extension, prolonged sitting, increased walking distances, and negotiating steps. (met)  Increased L knee AROM flex to 110 deg. MET   I household ambulation.MET     Long Term Goals: 8 weeks   1  Increased L knee AROM 0 to 120 deg. Progressing, not met  2 I community ambulation with good knee ext  met  3 I negotiating stairs with alternating step pattern met  4 Report decreased L knee pain < or =  1/10 with adls such as bending, extension, prolonged sitting, increased walking distances, and negotiating steps. met  5 Increased MMT for B LE to 4/5 muscle grade to promote proper pelvic stability to decrease L knee pain < or =  1/10 with adls such as bending, extension, prolonged sitting, increased walking distances, and negotiating steps.met  6 Increased flexibility in B hamstrings to -20 deg with 90/90 test to promote proper pelvic stability to decrease L knee pain < or =  1/10 with adls such as bending, extension, prolonged sitting, increased walking distances, and negotiating steps. met  7 Patient to achieve CJ (at least 20% < 40% impaired, limited or restricted) level on the FOTO Outcomes Measurement System.  met   8  . Knee ext to 0 rom Progressing, not met     Long Term Goal Status:   continue per initial plan of care.  Reasons for Recertification of Therapy:   Patient is progressing in OP PT, but still experiencing L knee tightness and limitations in AROM which affects functional activities and ADLs. Will benefit from OP PT for continued OP PT for manual therapy, quad strengthening, and L LE strengthening to maximize L knee AROM and function.    PLAN   Cont to progress towards goals set by PT.  Work to  increase L knee ROM and quad strength next visit.         Freddy Martinez, PT

## 2023-06-01 ENCOUNTER — CLINICAL SUPPORT (OUTPATIENT)
Dept: REHABILITATION | Facility: OTHER | Age: 63
End: 2023-06-01
Payer: COMMERCIAL

## 2023-06-01 DIAGNOSIS — Z74.09 IMPAIRED FUNCTIONAL MOBILITY, BALANCE, GAIT, AND ENDURANCE: ICD-10-CM

## 2023-06-01 DIAGNOSIS — M62.81 MUSCLE WEAKNESS OF LOWER EXTREMITY: ICD-10-CM

## 2023-06-01 DIAGNOSIS — M25.662 DECREASED RANGE OF MOTION OF LEFT KNEE: Primary | ICD-10-CM

## 2023-06-01 PROCEDURE — 97530 THERAPEUTIC ACTIVITIES: CPT | Mod: PN

## 2023-06-01 PROCEDURE — 97110 THERAPEUTIC EXERCISES: CPT | Mod: PN

## 2023-06-01 PROCEDURE — 97140 MANUAL THERAPY 1/> REGIONS: CPT | Mod: PN

## 2023-06-02 NOTE — PROGRESS NOTES
"[facility]   Physical Therapy Treatment Note     Name: Jen Alarcon    Clinic Number: [id number]    Therapy Diagnosis:   Encounter Diagnoses   Name Primary?    Decreased range of motion of left knee Yes    Muscle weakness of lower extremity     Impaired functional mobility, balance, gait, and endurance        Physician: Art Yuan MD    Visit Date: 6/2/2023      Physician Orders: PT Eval and Treat   Medical Diagnosis: Z96.652 (ICD-10-CM) - History of total knee replacement, left  Evaluation Date: 3/28/2023  Surgery Date: 3/13/2023  Authorization Period Expiration: 12/31/2023  Plan of Care Certification Period: 7/18/23  Visit # / Visits authorized: 25 (24/ 40)  FOTO: 0/5  (6/5/2023)     PTA Visit #: 1/ 5    Precautions: Standard    Time In: 10:06 am  Time Out: 10:50 am  Total Billable Time: 44 minutes    SUBJECTIVE   Pt reports: Knee is a little achy today    She was compliant with home exercise program.  Response to previous treatment: "I really felt good, but the soreness hit me the next day"  Functional change: walking down stairs has gotten better     Pain: 1-2/10   Location: L knee    OBJECTIVE     6/5/2023:  L Knee AROM: 3 to 109 deg  L Knee PROM:  0 to 117 deg     6/1/2023:  L knee AROM: 3 to 110 deg  L Knee PROM:  0 to 107 deg       TREATMENT     JEN received the bolded treatments listed below:      Jen received therapeutic exercises for 20 minutes for improved strength and AROM including:  Recumbent bike 6'  to increase ROM (seat at 7), level 2   Stair flex stretch 30 sec x 4   Gastroc stretch on incline x 90"  Soleus stretch on incline x 90"  Treadmill backwards walking TKE .8 mph for 5 min   Shuttle DL 2 x 10 reps with 3 black bands, 1 red band 2 x 10 reps  Shuttle SL press 1 black cord, 1 red cord 2 x 15  L only  Prone knee hang 2# x 6'  Standing TKEs against ball 20 x 5"  Prone quad stretch x 90"  Seated hamstring curls RTB 2 x 10 reps     Jen participated in " "dynamic functional therapeutic activities to improve functional performance for 16 minutes, including:  Forward step ups 20 x reps with 6" step with black monster band TKE  Lateral step downs 2 x 10 reps 4" step  Heel walking 60 ft x 2   Goblet squats 2 x 10 reps with 3#, black monster band L knee  Lateral stepping with GTB 4 x 30 ft  Hesitation march 4 x 30 with 5#      Jen received the following manual therapy techniques: Joint mobilizations were applied to the: L knee for 8 minutes, including:  Grades II, III, and IV tibial glides to increase flexion in sitting  Tibial rotation with flex/ext  Overpressure into extension/flex  Superior and inferior patellar mobs    Tibial MR/ER gr 2-3 oscill with knee flex/ext  Seated knee flexion stretch      JEN received cold pack for 00 minutes to left knee post treatment with heel prop stretch.     PATIENT EDUCATION AND HOME EXERCISES     Home Exercises Provided and Patient Education Provided     Education provided:   - Pt edu on proper exercise technique.      Written Home Exercises Provided: Patient instructed to cont prior HEP. Exercises were reviewed and JEN was able to demonstrate them prior to the end of the session.  JEN demonstrated good  understanding of the education provided. See EMR under Patient Instructions for exercises provided during therapy sessions    ASSESSMENT   Pt tolerated treatment well today with a slight improvement in knee flexion. Continued with functional activities to improve quad/hip strength and knee ROM. Pt going on trip for 2 weeks and expressed she would keep up with her HEP while gone.    JEN Is progressing   towards her goals.   Pt prognosis is Good.     Pt will continue to benefit from skilled outpatient physical therapy to address the deficits listed in the problem list box on initial evaluation, provide pt/family education and to maximize pt's level of independence in the home and community environment.     Pt's spiritual, " cultural and educational needs considered and pt agreeable to plan of care and goals.     Anticipated barriers to physical therapy: None      Previous Short Term Goals Status:     Short Term Goals: 4 weeks   Independent with HEP. (Ongoing)  Report decreased L knee pain < or =  2/10 with adls such as bending, extension, prolonged sitting, increased walking distances, and negotiating steps. (met)  Increased L knee AROM flex to 110 deg. MET   I household ambulation.MET     Long Term Goals: 8 weeks   1  Increased L knee AROM 0 to 120 deg. Progressing, not met  2 I community ambulation with good knee ext  met  3 I negotiating stairs with alternating step pattern met  4 Report decreased L knee pain < or =  1/10 with adls such as bending, extension, prolonged sitting, increased walking distances, and negotiating steps. met  5 Increased MMT for B LE to 4/5 muscle grade to promote proper pelvic stability to decrease L knee pain < or =  1/10 with adls such as bending, extension, prolonged sitting, increased walking distances, and negotiating steps.met  6 Increased flexibility in B hamstrings to -20 deg with 90/90 test to promote proper pelvic stability to decrease L knee pain < or =  1/10 with adls such as bending, extension, prolonged sitting, increased walking distances, and negotiating steps. met  7 Patient to achieve CJ (at least 20% < 40% impaired, limited or restricted) level on the FOTO Outcomes Measurement System.  met   8  . Knee ext to 0 rom Progressing, not met     Long Term Goal Status:   continue per initial plan of care.  Reasons for Recertification of Therapy:   Patient is progressing in OP PT, but still experiencing L knee tightness and limitations in AROM which affects functional activities and ADLs. Will benefit from OP PT for continued OP PT for manual therapy, quad strengthening, and L LE strengthening to maximize L knee AROM and function.    PLAN   Cont to progress towards goals set by PT.  Work to  increase L knee ROM and quad strength next visit.       Delos Bailey III, PTA

## 2023-06-05 ENCOUNTER — CLINICAL SUPPORT (OUTPATIENT)
Dept: REHABILITATION | Facility: OTHER | Age: 63
End: 2023-06-05
Payer: COMMERCIAL

## 2023-06-05 DIAGNOSIS — M62.81 MUSCLE WEAKNESS OF LOWER EXTREMITY: ICD-10-CM

## 2023-06-05 DIAGNOSIS — Z74.09 IMPAIRED FUNCTIONAL MOBILITY, BALANCE, GAIT, AND ENDURANCE: ICD-10-CM

## 2023-06-05 DIAGNOSIS — M25.662 DECREASED RANGE OF MOTION OF LEFT KNEE: Primary | ICD-10-CM

## 2023-06-05 PROCEDURE — 97110 THERAPEUTIC EXERCISES: CPT | Mod: PN,CQ

## 2023-06-05 PROCEDURE — 97140 MANUAL THERAPY 1/> REGIONS: CPT | Mod: PN,CQ

## 2023-06-05 PROCEDURE — 97530 THERAPEUTIC ACTIVITIES: CPT | Mod: PN,CQ

## 2023-06-19 NOTE — PROGRESS NOTES
"[facility]   Physical Therapy Treatment Note     Name: Jen Alarcon    Clinic Number: [id number]      Therapy Diagnosis:   Encounter Diagnoses   Name Primary?    Decreased range of motion of left knee Yes    Muscle weakness of lower extremity     Impaired functional mobility, balance, gait, and endurance        Physician: Art Yuan MD    Visit Date: 6/20/2023      Physician Orders: PT Eval and Treat   Medical Diagnosis: Z96.652 (ICD-10-CM) - History of total knee replacement, left  Evaluation Date: 3/28/2023  Surgery Date: 3/13/2023  Authorization Period Expiration: 12/31/2023  Plan of Care Certification Period: 7/18/23  Visit # / Visits authorized: 26 (25/ 40)  FOTO: 1/5  (6/5/2023)     PTA Visit #: 2/ 5    Precautions: Standard    Time In: 1:02 pm  Time Out: 1:45 am  Total Billable Time: 43 minutes    SUBJECTIVE   Pt reports: Did a lot of walking on her vacation. Swelling increased with walking, but wasn't able to get ice to put on knee. Had knee pain with the long flights. No pain today, just stiffness    She was compliant with home exercise program.  Response to previous treatment: No adverse effect  Functional change: walking down stairs has gotten better     Pain: 0/10   Location: L knee    OBJECTIVE     6/20/2023:  L Knee AROM: 3 to 110 deg  L Knee PROM:  0 to 114 deg     6/5/2023:  L Knee AROM: 3 to 109 deg  L Knee PROM: 0 to 117 deg     6/1/2023:  L knee AROM: 3 to 110 deg  L Knee PROM: 0 to 107 deg       TREATMENT     JEN received the bolded treatments listed below:      Jen received therapeutic exercises for 15 minutes for improved strength and AROM including:  Recumbent bike 6'  to increase ROM (seat at 7), level 2   Stair flex stretch 30 sec x 4   Gastroc stretch on incline x 90"  Hamstring stretch 3 x 30"  Soleus stretch on incline x 90"  Treadmill backwards walking TKE .8 mph for 5 min   Prone knee hang 2# x 6'  Standing TKEs against ball 20 x 5"  Prone quad " "stretch x 90"  Seated hamstring curls RTB 2 x 10 reps     Jen participated in dynamic functional therapeutic activities to improve functional performance for 13 minutes, including:  Forward step ups 20 x reps with 6" step with black monster band TKE  Lateral step downs 2 x 10 reps 4" step  Heel walking 60 ft x 2   Goblet squats 2 x 10 reps with 3#, black monster band L knee  Lateral stepping with GTB 4 x 30 ft  Hesitation march 4 x 30 with 5#   Shuttle DL with 3 black/1 red band 2 x 10 reps  Shuttle SL 1 black/1 red band 2 x 10  L only     Jen received the following manual therapy techniques: Joint mobilizations were applied to the: L knee for 15 minutes, including:  Grades II, III, and IV tibial glides to increase flexion in sitting  Tibial rotation with flex/ext  Overpressure into extension/flex  Superior and inferior patellar mobs    Tibial MR/ER gr 2-3 oscill with knee flex/ext  Seated knee flexion stretch      JEN received cold pack for 00 minutes to left knee post treatment with heel prop stretch.     PATIENT EDUCATION AND HOME EXERCISES     Home Exercises Provided and Patient Education Provided     Education provided:   - Continuance of HEP    Written Home Exercises Provided: Patient instructed to cont prior HEP. Exercises were reviewed and JEN was able to demonstrate them prior to the end of the session.  JEN demonstrated good  understanding of the education provided. See EMR under Patient Instructions for exercises provided during therapy sessions    ASSESSMENT   Pt completed today's treatment with increased tightness from being on two week trip and not being able to keep up with HEP. ROM continues to be limited, spent decent amount of time performing mobilizations and manual therapy will minimal improvement. Will continue with exercises and manual to increase ROM and strength.    JEN Is progressing well towards her goals.   Pt prognosis is Good.     Pt will continue to benefit from skilled " outpatient physical therapy to address the deficits listed in the problem list box on initial evaluation, provide pt/family education and to maximize pt's level of independence in the home and community environment.     Pt's spiritual, cultural and educational needs considered and pt agreeable to plan of care and goals.     Anticipated barriers to physical therapy: None      Previous Short Term Goals Status:     Short Term Goals: 4 weeks   Independent with HEP. (Ongoing)  Report decreased L knee pain < or =  2/10 with adls such as bending, extension, prolonged sitting, increased walking distances, and negotiating steps. (met)  Increased L knee AROM flex to 110 deg. MET   I household ambulation.  MET     Long Term Goals: 8 weeks   1  Increased L knee AROM 0 to 120 deg. Progressing, not met  2 I community ambulation with good knee ext   MET  3 I negotiating stairs with alternating step pattern  MET  4 Report decreased L knee pain < or =  1/10 with adls such as bending, extension, prolonged sitting, increased walking distances, and negotiating steps.  MET  5 Increased MMT for B LE to 4/5 muscle grade to promote proper pelvic stability to decrease L knee pain < or =  1/10 with adls such as bending, extension, prolonged sitting, increased walking distances, and negotiating steps. MET  6 Increased flexibility in B hamstrings to -20 deg with 90/90 test to promote proper pelvic stability to decrease L knee pain < or =  1/10 with adls such as bending, extension, prolonged sitting, increased walking distances, and negotiating steps. MET  7 Patient to achieve CJ (at least 20% < 40% impaired, limited or restricted) level on the FOTO Outcomes Measurement System.  MET   8  . Knee ext to 0 rom Progressing, not met     Long Term Goal Status:  continue per initial plan of care.  Reasons for Recertification of Therapy:   Patient is progressing in OP PT, but still experiencing L knee tightness and limitations in AROM which affects  functional activities and ADLs. Will benefit from OP PT for continued OP PT for manual therapy, quad strengthening, and L LE strengthening to maximize L knee AROM and function.    PLAN   Cont to progress towards goals set by PT.  Work to increase L knee ROM and quad strength next visit.       Rosmery Bailey III, PTA

## 2023-06-20 ENCOUNTER — CLINICAL SUPPORT (OUTPATIENT)
Dept: REHABILITATION | Facility: OTHER | Age: 63
End: 2023-06-20
Payer: COMMERCIAL

## 2023-06-20 DIAGNOSIS — M62.81 MUSCLE WEAKNESS OF LOWER EXTREMITY: ICD-10-CM

## 2023-06-20 DIAGNOSIS — Z74.09 IMPAIRED FUNCTIONAL MOBILITY, BALANCE, GAIT, AND ENDURANCE: ICD-10-CM

## 2023-06-20 DIAGNOSIS — M25.662 DECREASED RANGE OF MOTION OF LEFT KNEE: Primary | ICD-10-CM

## 2023-06-20 PROCEDURE — 97530 THERAPEUTIC ACTIVITIES: CPT | Mod: PN,CQ

## 2023-06-20 PROCEDURE — 97140 MANUAL THERAPY 1/> REGIONS: CPT | Mod: PN,CQ

## 2023-06-20 PROCEDURE — 97110 THERAPEUTIC EXERCISES: CPT | Mod: PN,CQ

## 2023-06-21 ENCOUNTER — CLINICAL SUPPORT (OUTPATIENT)
Dept: REHABILITATION | Facility: OTHER | Age: 63
End: 2023-06-21
Payer: COMMERCIAL

## 2023-06-21 DIAGNOSIS — Z74.09 IMPAIRED FUNCTIONAL MOBILITY, BALANCE, GAIT, AND ENDURANCE: ICD-10-CM

## 2023-06-21 DIAGNOSIS — M62.81 MUSCLE WEAKNESS OF LOWER EXTREMITY: ICD-10-CM

## 2023-06-21 DIAGNOSIS — M25.662 DECREASED RANGE OF MOTION OF LEFT KNEE: Primary | ICD-10-CM

## 2023-06-21 PROCEDURE — 97530 THERAPEUTIC ACTIVITIES: CPT | Mod: PN | Performed by: INTERNAL MEDICINE

## 2023-06-21 PROCEDURE — 97112 NEUROMUSCULAR REEDUCATION: CPT | Mod: PN | Performed by: INTERNAL MEDICINE

## 2023-06-21 PROCEDURE — 97110 THERAPEUTIC EXERCISES: CPT | Mod: PN | Performed by: INTERNAL MEDICINE

## 2023-06-22 NOTE — PROGRESS NOTES
"[facility]   Physical Therapy Treatment Note     Name: Jen CORONA Alarcon    Clinic Number: [id number]      Therapy Diagnosis:   Encounter Diagnoses   Name Primary?    Decreased range of motion of left knee Yes    Muscle weakness of lower extremity     Impaired functional mobility, balance, gait, and endurance      Physician: Art Yuan MD    Visit Date: 6/23/2023    Physician Orders: PT Eval and Treat   Medical Diagnosis: Z96.652 (ICD-10-CM) - History of total knee replacement, left  Evaluation Date: 3/28/2023  Surgery Date: 3/13/2023  Authorization Period Expiration: 12/31/2023  Plan of Care Certification Period: 7/18/23  Visit # / Visits authorized: 27 (26/ 40)  FOTO: d/c     PTA Visit #: 0/ 5    Precautions: Standard    Time In: 12:50   Time Out: 1:30 pm    Total Billable Time:  40 minutes    SUBJECTIVE   Pt reports: she is doing okay although her knee is still not straight.  Patient verbally reviewed HEP endorsing performance daily.     She wasn't compliant with home exercise program due to being out of town.  Response to previous treatment: No adverse effect  Functional change: walking down stairs has gotten better     Pain: 0/10  Location: L knee    OBJECTIVE   Objective measures updated at progress report unless otherwise noted.      TREATMENT     JEN received the bolded treatments listed below:      Jen received therapeutic exercises for 26 minutes for improved strength and AROM including:  Recumbent bike 8'  to increase ROM (seat at 7), level 3   Stair flex stretch 30 sec x 4    Gastroc stretch on incline x 90"  Hamstring stretch 3 x 30"  Soleus stretch on incline x 90"  Treadmill backwards walking TKE .8 mph for 5 min   Prone knee hang 3# x 5'  Standing TKEs against ball 20 x 5"  Prone quad stretch x 3x 1 minute  Heel slides with 10 second holds x5 minutes  Heel prop stretch with 5# above and below knee x5 minutes    Seated hamstring curls RTB 2 x 10 reps     Jen " "participated in dynamic functional therapeutic activities to improve functional performance for 14 minutes, including:  Education regarding stretching and improving RANGE OF MOTION at home  Forward step ups 20 x reps with 6" step with black monster band TKE  Lateral step downs 3 x 10 reps 4" step  Heel walking 60 ft x 2   Goblet squats 2 x 10 reps with 3#, black monster band L knee  Lateral stepping with GTB 4 x 30 ft  Hesitation march 4 x 30 with 5#   Shuttle DL with 3 black/1 red band 2 x 10 reps  Shuttle SL 1 black/1 red band 3 x 10  L only     Jen received the following manual therapy techniques: Joint mobilizations were applied to the: L knee for 00 minutes, including:  Grades II, III, and IV tibial glides to increase flexion in sitting  Tibial rotation with flex/ext  Overpressure into extension/flex  Superior and inferior patellar mobs    Tibial MR/ER gr 2-3 oscill with knee flex/ext  Seated knee flexion stretch      JEN received cold pack for 00 minutes to left knee post treatment with heel prop stretch.     PATIENT EDUCATION AND HOME EXERCISES     Home Exercises Provided and Patient Education Provided     Education provided:   - Continuance of HEP  - increasing frequency of HEP for knee extension and flexion RANGE OF MOTION to 3-5x/day and adding heel prop for additional stretch    Written Home Exercises Provided: Patient instructed to cont prior HEP. Exercises were reviewed and JEN was able to demonstrate them prior to the end of the session.  JEN demonstrated good  understanding of the education provided. See EMR under Patient Instructions for exercises provided during therapy sessions    ASSESSMENT   Patient continues to have difficulty will range of motion.  Heavy emphasis on RANGE OF MOTION and stretching today as well as education regarding increasing frequency and duration at home as well as modification with use of a heel prop to improve extension. Continue with RANGE OF MOTION as tolerated.  "     RANDY Is progressing well towards her goals.   Pt prognosis is Good.     Pt will continue to benefit from skilled outpatient physical therapy to address the deficits listed in the problem list box on initial evaluation, provide pt/family education and to maximize pt's level of independence in the home and community environment.     Pt's spiritual, cultural and educational needs considered and pt agreeable to plan of care and goals.     Anticipated barriers to physical therapy: None      Previous Short Term Goals Status:     Short Term Goals: 4 weeks   Independent with HEP. (Ongoing)  Report decreased L knee pain < or =  2/10 with adls such as bending, extension, prolonged sitting, increased walking distances, and negotiating steps. (met)  Increased L knee AROM flex to 110 deg. MET   I household ambulation.  MET     Long Term Goals: 8 weeks   1  Increased L knee AROM 0 to 120 deg. Progressing, not met  2 I community ambulation with good knee ext   MET  3 I negotiating stairs with alternating step pattern  MET  4 Report decreased L knee pain < or =  1/10 with adls such as bending, extension, prolonged sitting, increased walking distances, and negotiating steps.  MET  5 Increased MMT for B LE to 4/5 muscle grade to promote proper pelvic stability to decrease L knee pain < or =  1/10 with adls such as bending, extension, prolonged sitting, increased walking distances, and negotiating steps. MET  6 Increased flexibility in B hamstrings to -20 deg with 90/90 test to promote proper pelvic stability to decrease L knee pain < or =  1/10 with adls such as bending, extension, prolonged sitting, increased walking distances, and negotiating steps. MET  7 Patient to achieve CJ (at least 20% < 40% impaired, limited or restricted) level on the FOTO Outcomes Measurement System.  MET   8  . Knee ext to 0 rom Progressing, not met      PLAN   Cont to progress towards goals set by PT.  Work to increase L knee ROM and quad strength  next visit.       Gifty Salguero, PT

## 2023-06-23 ENCOUNTER — CLINICAL SUPPORT (OUTPATIENT)
Dept: REHABILITATION | Facility: OTHER | Age: 63
End: 2023-06-23
Payer: COMMERCIAL

## 2023-06-23 DIAGNOSIS — M62.81 MUSCLE WEAKNESS OF LOWER EXTREMITY: ICD-10-CM

## 2023-06-23 DIAGNOSIS — M25.662 DECREASED RANGE OF MOTION OF LEFT KNEE: Primary | ICD-10-CM

## 2023-06-23 DIAGNOSIS — Z74.09 IMPAIRED FUNCTIONAL MOBILITY, BALANCE, GAIT, AND ENDURANCE: ICD-10-CM

## 2023-06-23 PROCEDURE — 97110 THERAPEUTIC EXERCISES: CPT | Mod: PN

## 2023-06-23 PROCEDURE — 97530 THERAPEUTIC ACTIVITIES: CPT | Mod: PN

## 2023-06-23 NOTE — PLAN OF CARE
[facility]   Physical Therapy  progress Note     Name: Jen Alarcon    Clinic Number: [id number]      Therapy Diagnosis:   Encounter Diagnoses   Name Primary?    Decreased range of motion of left knee Yes    Muscle weakness of lower extremity     Impaired functional mobility, balance, gait, and endurance        Physician: Art Yuan MD    Visit Date: 6/21/2023        Physician Orders: PT Eval and Treat   Medical Diagnosis: Z96.652 (ICD-10-CM) - History of total knee replacement, left  Evaluation Date: 3/28/2023  Surgery Date: 3/13/2023  Authorization Period Expiration: 12/31/2023  Plan of Care Certification Period: 7/18/23  Visit # / Visits authorized: 26 (25/ 40)  FOTO: dc    PTA Visit #: 0/ 5    Precautions: Standard    Time In: 12 pm   Time Out: 1250 pm   Total Billable Time:  50 minutes    SUBJECTIVE   Pt reports: rom coming along slowly. Went to [place], [place], [place], [place]. Amb 10 miles one day. Was unable to ice  her knee but was able to keep up with everyone else with  only mild swelling.  [employer] in July     She wasn't compliant with home exercise program due to being out of town.  Response to previous treatment: No adverse effect  Functional change: walking down stairs has gotten better     Pain: 0. 5 ant knee   Location: L knee    OBJECTIVE     6/21  30 seconds sit<>stand: 19  Measures LE functional strength  Normal:   Age Male Female   60-64 17 15   65-69 16 15   70-74 15 14   75-79 14 13   80-84 13 12   85-89 11 11   90-94 9 9            6/21/2023  Microfet quads  R, 48, 50, 55= 51   L 54, 49. 48= 50   L is 98% of R     6/21 MMT  B LE 5/5  except KE L 4+    ROM   - 5  L knee  prone   115 flex     6/20/2023:  L Knee AROM: 3 to 110 deg  L Knee PROM:  0 to 114 deg     6/5/2023:  L Knee AROM: 3 to 109 deg  L Knee PROM: 0 to 117 deg     6/1/2023:  L knee AROM: 3 to 110 deg  L Knee PROM: 0 to 107 deg       TREATMENT     JEN received the bolded treatments listed  "below:      Jen received therapeutic exercises for 27 minutes for improved strength and AROM including:   reassessment  Stair flex stretch 30 sec x 4    Gastroc stretch on incline x 90"  Hamstring stretch 3 x 30"  Soleus stretch on incline x 90"  Treadmill backwards walking TKE .8 mph for 5 min   Prone knee hang 3# x 5'  Standing TKEs against ball 20 x 5"  Prone quad stretch x 2 min strap f/b prom  Seated hamstring curls RTB 2 x 10 reps    Neuromuscular reeducation for improving posture, balance, coordination including x 8 min   LAQ 4# 30x    Hesitation march 4 x 30 with 5#     Jen participated in dynamic functional therapeutic activities to improve functional performance for   12 minutes, including:  Recumbent bike 6'  to increase ROM (seat at 7), level 2   Forward step ups 20 x reps with 6" step with black monster band TKE  Lateral step downs 3 x 10 reps 4" step  Heel walking 60 ft x 2   Goblet squats 2 x 10 reps with 3#, black monster band L knee  Lateral stepping with GTB 4 x 30 ft  Hesitation march 4 x 30 with 5#   Shuttle DL with 3 black/1 red band 2 x 10 reps  Shuttle SL 1 black/1 red band 3 x 10  L only     Jen received the following manual therapy techniques: Joint mobilizations were applied to the: L knee for  5 minutes, including:  Grades II, III, patella / prox tibia  Tibial rotation with flex/ext  Overpressure into extension/flex  Superior and inferior patellar mobs  Tibial MR/ER gr 2-3 oscill with knee flex/ext  Seated knee flexion stretch      JEN received cold pack for 00 minutes to left knee post treatment with heel prop stretch.     PATIENT EDUCATION AND HOME EXERCISES     Home Exercises Provided and Patient Education Provided     Education provided:   -prone hangs 2-3x per day for 5-10 min with 1-2#    Written Home Exercises Provided: Patient instructed to cont prior HEP. Exercises were reviewed and JEN was able to demonstrate them prior to the end of the session.  JEN demonstrated " good  understanding of the education provided. See EMR under Patient Instructions for exercises provided during therapy sessions    ASSESSMENT   Progressing well with LE strength but cont deficits with knee ext rom and decreased KE with gait. Ed to be more consistent with prone hangs at home. Good sit so stand reps. Met FOTO goal.    RANDY Is progressing well towards her goals.   Pt prognosis is Good.     Pt will continue to benefit from skilled outpatient physical therapy to address the deficits listed in the problem list box on initial evaluation, provide pt/family education and to maximize pt's level of independence in the home and community environment.     Pt's spiritual, cultural and educational needs considered and pt agreeable to plan of care and goals.     Anticipated barriers to physical therapy: None      Previous Short Term Goals Status:     Short Term Goals: 4 weeks   Independent with HEP. (Ongoing)  Report decreased L knee pain < or =  2/10 with adls such as bending, extension, prolonged sitting, increased walking distances, and negotiating steps. (met)  Increased L knee AROM flex to 110 deg. MET   I household ambulation.  MET     Long Term Goals: 8 weeks   1  Increased L knee AROM 0 to 120 deg. Progressing, not met  2 I community ambulation with good knee ext   MET  3 I negotiating stairs with alternating step pattern  MET  4 Report decreased L knee pain < or =  1/10 with adls such as bending, extension, prolonged sitting, increased walking distances, and negotiating steps.  MET  5 Increased MMT for B LE to 4/5 muscle grade to promote proper pelvic stability to decrease L knee pain < or =  1/10 with adls such as bending, extension, prolonged sitting, increased walking distances, and negotiating steps. MET  6 Increased flexibility in B hamstrings to -20 deg with 90/90 test to promote proper pelvic stability to decrease L knee pain < or =  1/10 with adls such as bending, extension, prolonged sitting,  increased walking distances, and negotiating steps. MET  7 Patient to achieve CJ (at least 20% < 40% impaired, limited or restricted) level on the FOTO Outcomes Measurement System.  MET   8  . Knee ext to 0 rom Progressing, not met     Long Term Goal Status:  continue per initial plan of care.  Reasons for Recertification of Therapy:   Patient is progressing in OP PT, but still experiencing L knee tightness and limitations in AROM which affects functional activities and ADLs. Will benefit from OP PT for continued OP PT for manual therapy, quad strengthening, and L LE strengthening to maximize L knee AROM and function.    PLAN   Cont to progress towards goals set by PT.  Work to increase L knee ROM and quad strength next visit.       Francheska Griggs, PT

## 2023-06-26 ENCOUNTER — CLINICAL SUPPORT (OUTPATIENT)
Dept: REHABILITATION | Facility: OTHER | Age: 63
End: 2023-06-26
Payer: COMMERCIAL

## 2023-06-26 DIAGNOSIS — M25.662 DECREASED RANGE OF MOTION OF LEFT KNEE: Primary | ICD-10-CM

## 2023-06-26 DIAGNOSIS — M62.81 MUSCLE WEAKNESS OF LOWER EXTREMITY: ICD-10-CM

## 2023-06-26 DIAGNOSIS — Z74.09 IMPAIRED FUNCTIONAL MOBILITY, BALANCE, GAIT, AND ENDURANCE: ICD-10-CM

## 2023-06-26 PROCEDURE — 97110 THERAPEUTIC EXERCISES: CPT | Mod: PN,CQ

## 2023-06-26 NOTE — PROGRESS NOTES
"[facility]   Physical Therapy Treatment Note     Name: Jen CORONA [facility]    Clinic Number: [id number]      Therapy Diagnosis:   Encounter Diagnoses   Name Primary?    Decreased range of motion of left knee Yes    Muscle weakness of lower extremity     Impaired functional mobility, balance, gait, and endurance      Physician: Art Yuan MD    Visit Date: 6/26/2023    Physician Orders: PT Eval and Treat   Medical Diagnosis: Z96.652 (ICD-10-CM) - History of total knee replacement, left  Evaluation Date: 3/28/2023  Surgery Date: 3/13/2023  Authorization Period Expiration: 12/31/2023  Plan of Care Certification Period: 7/18/23  Visit # / Visits authorized: 28 (27/ 40)  FOTO: d/c     PTA Visit #: 1/ 5    Precautions: Standard    Time In: 1300  Time Out: 1344  Total Billable Time:  44 minutes    SUBJECTIVE   Pt states cont to have some limited L knee motion.      She wasn't compliant with home exercise program due to being out of town.  Response to previous treatment: No adverse effect  Functional change: improved gait pattern     Pain: 0/10  Location: L knee    OBJECTIVE   Objective measures updated at progress report unless otherwise noted.      TREATMENT     JEN received the bolded treatments listed below:      Jen received therapeutic exercises for 44 minutes for improved strength and AROM including:  Recumbent bike 6'  to increase ROM (seat at 7), level 3   Stair flex stretch 2 min   Gastroc stretch on incline x 90"  Hamstring stretch 3 x 30"  Soleus stretch on incline x 90"  Treadmill backwards walking TKE .8 mph for 5 min   Prone knee hang 3# x 5'  Standing TKEs against ball 20 x 5"  Prone quad stretch x 3x 1 minute  Heel slides with 10 second holds x5 minutes  QS w/ strap around feet and over pressure 2 x 10 3 sec hold   Heel prop stretch with 5# above and below knee x5 minutes    Seated hamstring curls RTB 2 x 10 reps     Jen participated in dynamic functional therapeutic " "activities to improve functional performance for 0 minutes, including:    Forward step ups 20 x reps with 6" step with black monster band TKE  Lateral step downs 3 x 10 reps 4" step  Heel walking 60 ft x 2   Goblet squats 2 x 10 reps with 3#, black monster band L knee  Lateral stepping with GTB 4 x 30 ft  Hesitation march 4 x 30 with 5#   Shuttle DL with 3 black/1 red band 2 x 10 reps  Shuttle SL 1 black/1 red band 3 x 10  L only     Jen received the following manual therapy techniques: Joint mobilizations were applied to the: L knee for 00 minutes, including:  Grades II, III, and IV tibial glides to increase flexion in sitting  Tibial rotation with flex/ext  Overpressure into extension/flex  Superior and inferior patellar mobs    Tibial MR/ER gr 2-3 oscill with knee flex/ext  Seated knee flexion stretch      JEN received cold pack for 00 minutes to left knee post treatment with heel prop stretch.     PATIENT EDUCATION AND HOME EXERCISES     Home Exercises Provided and Patient Education Provided     Education provided:   - Pt edu on proper exercise technique      Written Home Exercises Provided: Patient instructed to cont prior HEP. Exercises were reviewed and JEN was able to demonstrate them prior to the end of the session.  JEN demonstrated good  understanding of the education provided. See EMR under Patient Instructions for exercises provided during therapy sessions    ASSESSMENT   Pt demonstrated good effort.  Tx focused on improving L knee ROM.  Pt cont to lack some knee ext.  Pt would cont to benefit from skilled care to improve joint ROM and quad strength.      JEN Is progressing well towards her goals.   Pt prognosis is Good.     Pt will continue to benefit from skilled outpatient physical therapy to address the deficits listed in the problem list box on initial evaluation, provide pt/family education and to maximize pt's level of independence in the home and community environment.     Pt's " spiritual, cultural and educational needs considered and pt agreeable to plan of care and goals.     Anticipated barriers to physical therapy: None      Previous Short Term Goals Status:     Short Term Goals: 4 weeks   Independent with HEP. (Ongoing)  Report decreased L knee pain < or =  2/10 with adls such as bending, extension, prolonged sitting, increased walking distances, and negotiating steps. (met)  Increased L knee AROM flex to 110 deg. MET   I household ambulation.  MET     Long Term Goals: 8 weeks   1  Increased L knee AROM 0 to 120 deg. Progressing, not met  2 I community ambulation with good knee ext   MET  3 I negotiating stairs with alternating step pattern  MET  4 Report decreased L knee pain < or =  1/10 with adls such as bending, extension, prolonged sitting, increased walking distances, and negotiating steps.  MET  5 Increased MMT for B LE to 4/5 muscle grade to promote proper pelvic stability to decrease L knee pain < or =  1/10 with adls such as bending, extension, prolonged sitting, increased walking distances, and negotiating steps. MET  6 Increased flexibility in B hamstrings to -20 deg with 90/90 test to promote proper pelvic stability to decrease L knee pain < or =  1/10 with adls such as bending, extension, prolonged sitting, increased walking distances, and negotiating steps. MET  7 Patient to achieve CJ (at least 20% < 40% impaired, limited or restricted) level on the FOTO Outcomes Measurement System.  MET   8  . Knee ext to 0 rom Progressing, not met      PLAN   Cont to progress towards goals set by PT.  Work to increase L knee ROM and quad strength next visit.       Connor Ledesma, PTA

## 2023-06-27 NOTE — PROGRESS NOTES
"[facility]   Physical Therapy Treatment Note     Name: Jen Alarcon    Clinic Number: [id number]      Therapy Diagnosis:   Encounter Diagnoses   Name Primary?    Decreased range of motion of left knee Yes    Muscle weakness of lower extremity     Impaired functional mobility, balance, gait, and endurance      Physician: Art Yuan MD    Visit Date: 6/28/2023    Physician Orders: PT Eval and Treat   Medical Diagnosis: Z96.652 (ICD-10-CM) - History of total knee replacement, left  Evaluation Date: 3/28/2023  Surgery Date: 3/13/2023  Authorization Period Expiration: 12/31/2023  Plan of Care Certification Period: 7/18/23  Visit # / Visits authorized: 30 (29/ 40)  FOTO: d/c     PTA Visit #: 2/ 5    Precautions: Standard    Time In: 3:15 pm  Time Out: 4:00 pm  Total Billable Time:  45 minutes    SUBJECTIVE   Pt reports: "The knee is feeling fine"     She was compliant with home exercise program   Response to previous treatment:   Functional change: improved gait pattern     Pain: 0/10  Location: L knee    OBJECTIVE     6/20/2023:  L Knee ROM: 1 to 113 deg   (Flexion on stairs)      TREATMENT     JEN received the bolded treatments listed below:      Jen received therapeutic exercises for 30 minutes for improved strength and AROM including:  Recumbent bike 6'  to increase ROM (seat at 6-7), level 3   Stair flex stretch 2 min   Gastroc stretch on incline x 90"  Hamstring stretch 3 x 30"  Soleus stretch on incline x 90"  Treadmill backwards walking TKE .8 mph for 5 min   Prone knee hang 4# x 5'  Standing TKEs against ball 20 x 5"  Prone quad stretch x 3x 1 minute  Heel slides with 10 second holds x5 minutes  QS w/ strap around feet and over pressure 2 x 10 3 sec hold   Heel prop stretch with 5# above and below knee x5 minutes  LAQ 3# x 20    Seated hamstring curls RTB 2 x 10 reps     Jen participated in dynamic functional therapeutic activities to improve functional performance for 00 " "minutes, including:    Forward step ups 20 x reps with 6" step with black monster band TKE  Lateral step downs 3 x 10 reps 4" step  Heel walking 60 ft x 2   Goblet squats 2 x 10 reps with 3#, black monster band L knee  Lateral stepping with GTB 4 x 30 ft  Hesitation march 4 x 30 with 5#   Shuttle DL with 3 black/1 red band 2 x 10 reps  Shuttle SL 1 black/1 red band 3 x 10  L only     Jen received the following manual therapy techniques: Joint mobilizations were applied to the: L knee for 15 minutes, including:  Grades II, III, and IV tibial glides to increase flexion in sitting  Tibial rotation with flex/ext  Overpressure into extension/flex  Superior and inferior patellar mobs  TF PA glide    Tibial MR/ER gr 2-3 oscill with knee flex/ext  Seated knee flexion stretch      JEN received cold pack for 00 minutes to left knee post treatment with heel prop stretch.     PATIENT EDUCATION AND HOME EXERCISES     Home Exercises Provided and Patient Education Provided     Education provided:   - Continuance of HEP    Written Home Exercises Provided: Patient instructed to cont prior HEP. Exercises were reviewed and JEN was able to demonstrate them prior to the end of the session.  JEN demonstrated good  understanding of the education provided. See EMR under Patient Instructions for exercises provided during therapy sessions    ASSESSMENT   Today's treatment focused on knee extension. Increased weight on prone hangs with good tolerance from pt. ROM continues to be limited, but pt remains motivated to improve knee mobility.    JEN Is progressing well towards her goals.   Pt prognosis is Good.     Pt will continue to benefit from skilled outpatient physical therapy to address the deficits listed in the problem list box on initial evaluation, provide pt/family education and to maximize pt's level of independence in the home and community environment.     Pt's spiritual, cultural and educational needs considered and pt " agreeable to plan of care and goals.     Anticipated barriers to physical therapy: None      Previous Short Term Goals Status:     Short Term Goals: 4 weeks   Independent with HEP. (Ongoing)  Report decreased L knee pain < or =  2/10 with adls such as bending, extension, prolonged sitting, increased walking distances, and negotiating steps. (met)  Increased L knee AROM flex to 110 deg. MET   I household ambulation.  MET     Long Term Goals: 8 weeks   1  Increased L knee AROM 0 to 120 deg. Progressing, not met  2 I community ambulation with good knee ext   MET  3 I negotiating stairs with alternating step pattern  MET  4 Report decreased L knee pain < or =  1/10 with adls such as bending, extension, prolonged sitting, increased walking distances, and negotiating steps.  MET  5 Increased MMT for B LE to 4/5 muscle grade to promote proper pelvic stability to decrease L knee pain < or =  1/10 with adls such as bending, extension, prolonged sitting, increased walking distances, and negotiating steps. MET  6 Increased flexibility in B hamstrings to -20 deg with 90/90 test to promote proper pelvic stability to decrease L knee pain < or =  1/10 with adls such as bending, extension, prolonged sitting, increased walking distances, and negotiating steps. MET  7 Patient to achieve CJ (at least 20% < 40% impaired, limited or restricted) level on the FOTO Outcomes Measurement System.  MET   8  . Knee ext to 0 rom Progressing, not met      PLAN   Cont to progress towards goals set by PT.  Work to increase L knee ROM and quad strength next visit.       Rosmery Bailey III, PTA

## 2023-06-28 ENCOUNTER — CLINICAL SUPPORT (OUTPATIENT)
Dept: REHABILITATION | Facility: OTHER | Age: 63
End: 2023-06-28
Payer: COMMERCIAL

## 2023-06-28 DIAGNOSIS — M62.81 MUSCLE WEAKNESS OF LOWER EXTREMITY: ICD-10-CM

## 2023-06-28 DIAGNOSIS — M25.662 DECREASED RANGE OF MOTION OF LEFT KNEE: Primary | ICD-10-CM

## 2023-06-28 DIAGNOSIS — Z74.09 IMPAIRED FUNCTIONAL MOBILITY, BALANCE, GAIT, AND ENDURANCE: ICD-10-CM

## 2023-06-28 PROCEDURE — 97110 THERAPEUTIC EXERCISES: CPT | Mod: PN,CQ

## 2023-06-28 PROCEDURE — 97140 MANUAL THERAPY 1/> REGIONS: CPT | Mod: PN,CQ

## 2023-06-28 NOTE — PROGRESS NOTES
"[facility]   Physical Therapy Treatment Note     Name: Jen CORONA Alarcon    Clinic Number: [id number]      Therapy Diagnosis:   Encounter Diagnoses   Name Primary?    Decreased range of motion of left knee Yes    Muscle weakness of lower extremity     Impaired functional mobility, balance, gait, and endurance      Physician: Art Yuan MD    Visit Date: 6/30/2023    Physician Orders: PT Eval and Treat   Medical Diagnosis: Z96.652 (ICD-10-CM) - History of total knee replacement, left  Evaluation Date: 3/28/2023  Surgery Date: 3/13/2023  Authorization Period Expiration: 12/31/2023  Plan of Care Certification Period: 7/18/23  Visit # / Visits authorized: 29 (28/ 40)  Re-assessment: due 7/21/2023  FOTO: d/c     PTA Visit #: 0/ 5    Precautions: Standard    Time In: 12:50 pm   Time Out: 1:30 pm   Total Billable Time:  40 minutes    SUBJECTIVE   Pt states she is doing okay.  Felt good after last session.     She wasn't compliant with home exercise program due to being out of town.  Response to previous treatment: No adverse effect  Functional change: improved gait pattern     Pain: 0/10  Location: L knee    OBJECTIVE   Objective measures updated at progress report unless otherwise noted.      TREATMENT     JEN received the bolded treatments listed below:      Jen received therapeutic exercises for 44 minutes for improved strength and AROM including:  Recumbent bike 6'  to increase ROM (seat at 7), level 3   Stair flex stretch 2 min   Gastroc stretch on incline x 90"  Hamstring stretch 3 x 30"  Soleus stretch on incline x 90"  Treadmill backwards walking TKE .8 mph for 5 min   Prone knee hang 3# x 5'  Standing TKEs against ball 20 x 5"  Prone quad stretch x 3x 1 minute  Prone TKE 30x 5 second holds   Heel slides with 10 second holds x5 minutes  QS w/ strap around feet and over pressure 2 x 10 3 sec hold   Heel prop stretch with 5# above and below knee x5 minutes  Hamstring/gastroc stretch " "long sitting with 1/2 foam 3x 30 seconds   Double to single leg heel raise on left x30    Seated hamstring curls RTB 2 x 10 reps     Jen participated in dynamic functional therapeutic activities to improve functional performance for 0 minutes, including:    Forward step ups 20 x reps with 6" step with black monster band TKE  Lateral step downs 3 x 10 reps 4" step  Heel walking 60 ft x 2   Goblet squats 2 x 10 reps with 3#, black monster band L knee  Lateral stepping with GTB 4 x 30 ft  Hesitation march 4 x 30 with 5#   Shuttle DL with 3 black/1 red band 2 x 10 reps  Shuttle SL 1 black/1 red band 3 x 10  L only     Jen received the following manual therapy techniques: Joint mobilizations were applied to the: L knee for 00 minutes, including:  Grades II, III, and IV tibial glides to increase flexion in sitting  Tibial rotation with flex/ext  Overpressure into extension/flex  Superior and inferior patellar mobs    Tibial MR/ER gr 2-3 oscill with knee flex/ext  Seated knee flexion stretch      JEN received cold pack for 00 minutes to left knee post treatment with heel prop stretch.     PATIENT EDUCATION AND HOME EXERCISES     Home Exercises Provided and Patient Education Provided     Education provided:   - Pt edu on proper exercise technique      Written Home Exercises Provided: Patient instructed to cont prior HEP. Exercises were reviewed and JEN was able to demonstrate them prior to the end of the session.  JEN demonstrated good  understanding of the education provided. See EMR under Patient Instructions for exercises provided during therapy sessions    ASSESSMENT   Patient continues to report have fair knee RANGE OF MOTION limited into extension and past 110 degrees. RANGE OF MOTION is functional although patient would like to continue towards normal range.   Heavy emphasis continued on knee RANGE OF MOTION without increase in pain.     JEN Is progressing well towards her goals.   Pt prognosis is Good. "     Pt will continue to benefit from skilled outpatient physical therapy to address the deficits listed in the problem list box on initial evaluation, provide pt/family education and to maximize pt's level of independence in the home and community environment.     Pt's spiritual, cultural and educational needs considered and pt agreeable to plan of care and goals.     Anticipated barriers to physical therapy: None      Previous Short Term Goals Status:     Short Term Goals: 4 weeks   Independent with HEP. (Ongoing)  Report decreased L knee pain < or =  2/10 with adls such as bending, extension, prolonged sitting, increased walking distances, and negotiating steps. (met)  Increased L knee AROM flex to 110 deg. MET   I household ambulation.  MET     Long Term Goals: 8 weeks   1  Increased L knee AROM 0 to 120 deg. Progressing, not met  2 I community ambulation with good knee ext   MET  3 I negotiating stairs with alternating step pattern  MET  4 Report decreased L knee pain < or =  1/10 with adls such as bending, extension, prolonged sitting, increased walking distances, and negotiating steps.  MET  5 Increased MMT for B LE to 4/5 muscle grade to promote proper pelvic stability to decrease L knee pain < or =  1/10 with adls such as bending, extension, prolonged sitting, increased walking distances, and negotiating steps. MET  6 Increased flexibility in B hamstrings to -20 deg with 90/90 test to promote proper pelvic stability to decrease L knee pain < or =  1/10 with adls such as bending, extension, prolonged sitting, increased walking distances, and negotiating steps. MET  7 Patient to achieve CJ (at least 20% < 40% impaired, limited or restricted) level on the F[employer] Outcomes Measurement System.  MET   8  . Knee ext to 0 rom Progressing, not met      PLAN   Cont to progress towards goals set by PT.  Work to increase L knee ROM and quad strength next visit.       Gifty Salguero, PT

## 2023-06-30 ENCOUNTER — CLINICAL SUPPORT (OUTPATIENT)
Dept: REHABILITATION | Facility: OTHER | Age: 63
End: 2023-06-30
Payer: COMMERCIAL

## 2023-06-30 DIAGNOSIS — Z74.09 IMPAIRED FUNCTIONAL MOBILITY, BALANCE, GAIT, AND ENDURANCE: ICD-10-CM

## 2023-06-30 DIAGNOSIS — M62.81 MUSCLE WEAKNESS OF LOWER EXTREMITY: ICD-10-CM

## 2023-06-30 DIAGNOSIS — M25.662 DECREASED RANGE OF MOTION OF LEFT KNEE: Primary | ICD-10-CM

## 2023-06-30 PROCEDURE — 97110 THERAPEUTIC EXERCISES: CPT | Mod: PN

## 2023-06-30 NOTE — PROGRESS NOTES
"[facility]   Physical Therapy Treatment Note     Name: Jen Alarcon    Clinic Number: [id number]    Therapy Diagnosis:   Encounter Diagnoses   Name Primary?    Decreased range of motion of left knee Yes    Muscle weakness of lower extremity     Impaired functional mobility, balance, gait, and endurance        Physician: Art Yuan MD    Visit Date: 7/3/2023    Physician Orders: PT Eval and Treat   Medical Diagnosis: Z96.652 (ICD-10-CM) - History of total knee replacement, left  Evaluation Date: 3/28/2023  Surgery Date: 3/13/2023  Authorization Period Expiration: 12/31/2023  Plan of Care Certification Period: 7/18/23  Visit # / Visits authorized: 32 (31/ 40)  Re-assessment: due 7/21/2023  FOTO: d/c     PTA Visit #: 1/ 5    Precautions: Standard    Time In: 2:32 pm   Time Out: 3:30 pm   Total Billable Time: 54 minutes    SUBJECTIVE   Pt reports: "I feel pretty good, no real pain"    She was compliant with home exercise program.  Response to previous treatment: "I felt good for two days after"  Functional change: improved gait pattern     Pain: 1/10  Location: L knee    OBJECTIVE     7/3/2023:  L Knee ROM: 1 to 114 deg     6/28/2023:  L Knee ROM: 1 to 113 deg   (Flexion on stairs)    TREATMENT     JEN received the bolded treatments listed below:      Jen received therapeutic exercises for 44 minutes for improved strength and AROM including:  Recumbent bike 6'  to increase ROM (seat at 7), level 3   Stair flex stretch 2 min   LAQ 3# x 20  Gastroc stretch on incline x 90"  Hamstring stretch 3 x 30"  Soleus stretch on incline x 90"  Treadmill backwards walking TKE .8 mph for 5 min   Prone knee hang 3# x 5'  Standing TKEs against ball 20 x 5"  Prone quad stretch x 3x 1 minute  Prone TKE 30x 5 second holds   Heel slides with 10 second holds x5 minutes  QS w/ strap around feet and over pressure 2 x 10 3 sec hold   Heel prop stretch with 5# above and below knee x5 " "minutes  Hamstring/gastroc stretch long sitting with 1/2 foam 3x 30 seconds   Double to single leg heel raise on left x30    Seated hamstring curls RTB 2 x 10 reps     Jen participated in dynamic functional therapeutic activities to improve functional performance for 00 minutes, including:    Forward step ups 20 x reps with 6" step with black monster band TKE  Lateral step downs 3 x 10 reps 4" step  Heel walking 60 ft x 2   Goblet squats 2 x 10 reps with 3#, black monster band L knee  Lateral stepping with GTB 4 x 30 ft  Hesitation march 4 x 30 with 5#   Shuttle DL with 3 black/1 red band 2 x 10 reps  Shuttle SL 1 black/1 red band 3 x 10  L only     Jen received the following manual therapy techniques: Joint mobilizations were applied to the: L knee for 10 minutes, including:  Grades II, III, and IV tibial glides to increase flexion in sitting  Tibial rotation with flex/ext  Overpressure into extension/flex  Superior and inferior patellar mobs  TF AP glides grade III-IV    Tibial MR/ER gr 2-3 oscill with knee flex/ext  Seated knee flexion stretch      JEN received cold pack for 00 minutes to left knee post treatment with heel prop stretch.     PATIENT EDUCATION AND HOME EXERCISES     Home Exercises Provided and Patient Education Provided     Education provided:   - Pt edu on proper exercise technique      Written Home Exercises Provided: Patient instructed to cont prior HEP. Exercises were reviewed and JEN was able to demonstrate them prior to the end of the session.  JEN demonstrated good  understanding of the education provided. See EMR under Patient Instructions for exercises provided during therapy sessions    ASSESSMENT   Pt tolerated treatment well, but experienced knee pain with manual therapy. Continued with stretches and strengthening to improve knee ROM, but progress is still limited due to pain felt with increasing range. Pt continues to have good motivation to make progress.    JEN Is " progressing well towards her goals.   Pt prognosis is Good.     Pt will continue to benefit from skilled outpatient physical therapy to address the deficits listed in the problem list box on initial evaluation, provide pt/family education and to maximize pt's level of independence in the home and community environment.     Pt's spiritual, cultural and educational needs considered and pt agreeable to plan of care and goals.     Anticipated barriers to physical therapy: None      Previous Short Term Goals Status:     Short Term Goals: 4 weeks   Independent with HEP. (Ongoing)  Report decreased L knee pain < or =  2/10 with adls such as bending, extension, prolonged sitting, increased walking distances, and negotiating steps. (met)  Increased L knee AROM flex to 110 deg. MET   I household ambulation.  MET     Long Term Goals: 8 weeks   1  Increased L knee AROM 0 to 120 deg. Progressing, not met  2 I community ambulation with good knee ext   MET  3 I negotiating stairs with alternating step pattern  MET  4 Report decreased L knee pain < or =  1/10 with adls such as bending, extension, prolonged sitting, increased walking distances, and negotiating steps.  MET  5 Increased MMT for B LE to 4/5 muscle grade to promote proper pelvic stability to decrease L knee pain < or =  1/10 with adls such as bending, extension, prolonged sitting, increased walking distances, and negotiating steps. MET  6 Increased flexibility in B hamstrings to -20 deg with 90/90 test to promote proper pelvic stability to decrease L knee pain < or =  1/10 with adls such as bending, extension, prolonged sitting, increased walking distances, and negotiating steps. MET  7 Patient to achieve CJ (at least 20% < 40% impaired, limited or restricted) level on the [employer] Outcomes Measurement System.  MET   8  . Knee ext to 0 rom Progressing, not met    PLAN   Cont to progress towards goals set by PT.  Work to increase L knee ROM and quad strength next visit.        Rosmery Bailey III, PTA

## 2023-07-03 ENCOUNTER — CLINICAL SUPPORT (OUTPATIENT)
Dept: REHABILITATION | Facility: OTHER | Age: 63
End: 2023-07-03
Payer: COMMERCIAL

## 2023-07-03 DIAGNOSIS — Z74.09 IMPAIRED FUNCTIONAL MOBILITY, BALANCE, GAIT, AND ENDURANCE: ICD-10-CM

## 2023-07-03 DIAGNOSIS — M25.662 DECREASED RANGE OF MOTION OF LEFT KNEE: Primary | ICD-10-CM

## 2023-07-03 DIAGNOSIS — M62.81 MUSCLE WEAKNESS OF LOWER EXTREMITY: ICD-10-CM

## 2023-07-03 PROCEDURE — 97110 THERAPEUTIC EXERCISES: CPT | Mod: PN,CQ

## 2023-07-03 PROCEDURE — 97140 MANUAL THERAPY 1/> REGIONS: CPT | Mod: PN,CQ

## 2023-07-04 NOTE — PROGRESS NOTES
"[facility]   Physical Therapy Treatment Note     Name: Jen Alarcon    Clinic Number: [id number]    Therapy Diagnosis:   Encounter Diagnoses   Name Primary?    Decreased range of motion of left knee Yes    Muscle weakness of lower extremity     Impaired functional mobility, balance, gait, and endurance        Physician: Art Yuan MD    Visit Date: 7/5/2023    Physician Orders: PT Eval and Treat   Medical Diagnosis: Z96.652 (ICD-10-CM) - History of total knee replacement, left  Evaluation Date: 3/28/2023  Surgery Date: 3/13/2023  Authorization Period Expiration: 12/31/2023  Plan of Care Certification Period: 7/18/23  Visit # / Visits authorized: 33 (32/ 40)  Re-assessment: due 7/21/2023  FOTO: d/c     PTA Visit #: 2/ 5    Precautions: Standard    Time In: 2:33 pm   Time Out: 3:18 pm   Total Billable Time: 45 minutes    SUBJECTIVE   Pt reports: Knee is feeling a little tight    She was compliant with home exercise program.  Response to previous treatment: "It was alright"  Functional change: improved gait pattern     Pain: 1/10  Location: L knee    OBJECTIVE     7/5/2023:  L Knee ROM: 1 to 114 deg     7/3/2023:  L Knee ROM: 1 to 114 deg     6/28/2023:  L Knee ROM: 1 to 113 deg   (Flexion on stairs)    TREATMENT     JEN received the bolded treatments listed below:      Jen received therapeutic exercises for 41 minutes for improved strength and AROM including:  Recumbent bike 6'  to increase ROM (seat at 7), level 3   Stair flex stretch 2 min   LAQ 3# x 20  Gastroc stretch on incline x 90"  Hamstring stretch 3 x 30"  Soleus stretch on incline x 90"  Treadmill backwards walking TKE .8 mph for 5 min   Prone knee hang 3# x 5'  Standing TKEs against ball 20 x 5"  Prone quad stretch x 3x 1 minute  Prone TKE 30x 5 second holds   Prone hamstring curls RTB x 20  Heel slides with 10 second holds x5 minutes  QS w/ strap around feet and over pressure 2 x 10 3 sec hold   Heel prop stretch " "with 5# above and below knee x5 minutes  Hamstring/gastroc stretch long sitting with 1/2 foam 3x 30 seconds   Double to single leg heel raise on left x30    Seated hamstring curls RTB 2 x 10 reps     Jen participated in dynamic functional therapeutic activities to improve functional performance for 00 minutes, including:    Forward step ups 20 x reps with 6" step with black monster band TKE  Lateral step downs 3 x 10 reps 4" step  Heel walking 60 ft x 2   Goblet squats 2 x 10 reps with 3#, black monster band L knee  Lateral stepping with GTB 4 x 30 ft  Hesitation march 4 x 30 with 5#   Shuttle DL with 3 black/1 red band 2 x 10 reps  Shuttle SL 1 black/1 red band 3 x 10  L only     Jen received the following manual therapy techniques: Joint mobilizations were applied to the: L knee for 4 minutes, including:  Grades II, III, and IV tibial glides to increase flexion in sitting  Tibial rotation with flex/ext  Overpressure into extension/flex  Superior and inferior patellar mobs  TF AP glides grade III-IV    Tibial MR/ER gr 2-3 oscill with knee flex/ext  Seated knee flexion stretch      JEN received cold pack for 00 minutes to left knee post treatment with heel prop stretch.     PATIENT EDUCATION AND HOME EXERCISES     Home Exercises Provided and Patient Education Provided     Education provided:   - Pt edu on proper exercise technique      Written Home Exercises Provided: Patient instructed to cont prior HEP. Exercises were reviewed and JEN was able to demonstrate them prior to the end of the session.  JEN demonstrated good  understanding of the education provided. See EMR under Patient Instructions for exercises provided during therapy sessions    ASSESSMENT   Pt completed today's treatment with no improvement in knee ROM with pain being the limiting factor. Continued with stretches and strengthening, as well as overpressure into flexion to improve knee ROM.    JEN Is progressing well towards her goals. "   Pt prognosis is Good.     Pt will continue to benefit from skilled outpatient physical therapy to address the deficits listed in the problem list box on initial evaluation, provide pt/family education and to maximize pt's level of independence in the home and community environment.     Pt's spiritual, cultural and educational needs considered and pt agreeable to plan of care and goals.     Anticipated barriers to physical therapy: None      Previous Short Term Goals Status:     Short Term Goals: 4 weeks   Independent with HEP. (Ongoing)  Report decreased L knee pain < or =  2/10 with adls such as bending, extension, prolonged sitting, increased walking distances, and negotiating steps. (met)  Increased L knee AROM flex to 110 deg. MET   I household ambulation.  MET     Long Term Goals: 8 weeks   1  Increased L knee AROM 0 to 120 deg. Progressing, not met  2 I community ambulation with good knee ext   MET  3 I negotiating stairs with alternating step pattern  MET  4 Report decreased L knee pain < or =  1/10 with adls such as bending, extension, prolonged sitting, increased walking distances, and negotiating steps.  MET  5 Increased MMT for B LE to 4/5 muscle grade to promote proper pelvic stability to decrease L knee pain < or =  1/10 with adls such as bending, extension, prolonged sitting, increased walking distances, and negotiating steps. MET  6 Increased flexibility in B hamstrings to -20 deg with 90/90 test to promote proper pelvic stability to decrease L knee pain < or =  1/10 with adls such as bending, extension, prolonged sitting, increased walking distances, and negotiating steps. MET  7 Patient to achieve CJ (at least 20% < 40% impaired, limited or restricted) level on the [employer] Outcomes Measurement System.  MET   8  . Knee ext to 0 rom Progressing, not met    PLAN   Cont to progress towards goals set by PT.  Work to increase L knee ROM and quad strength next visit.       Delos Bailey III, PTA

## 2023-07-05 ENCOUNTER — CLINICAL SUPPORT (OUTPATIENT)
Dept: REHABILITATION | Facility: OTHER | Age: 63
End: 2023-07-05
Payer: COMMERCIAL

## 2023-07-05 DIAGNOSIS — Z74.09 IMPAIRED FUNCTIONAL MOBILITY, BALANCE, GAIT, AND ENDURANCE: ICD-10-CM

## 2023-07-05 DIAGNOSIS — M25.662 DECREASED RANGE OF MOTION OF LEFT KNEE: Primary | ICD-10-CM

## 2023-07-05 DIAGNOSIS — M62.81 MUSCLE WEAKNESS OF LOWER EXTREMITY: ICD-10-CM

## 2023-07-05 PROCEDURE — 97110 THERAPEUTIC EXERCISES: CPT | Mod: PN,CQ

## 2023-07-06 NOTE — PROGRESS NOTES
"[facility]   Physical Therapy Treatment Note     Name: Jen Alarcon    Clinic Number: [id number]    Therapy Diagnosis:   Encounter Diagnoses   Name Primary?    Decreased range of motion of left knee Yes    Muscle weakness of lower extremity     Impaired functional mobility, balance, gait, and endurance        Physician: Art Yuan MD    Visit Date: 7/7/2023    Physician Orders: PT Eval and Treat   Medical Diagnosis: Z96.652 (ICD-10-CM) - History of total knee replacement, left  Evaluation Date: 3/28/2023  Surgery Date: 3/13/2023  Authorization Period Expiration: 12/31/2023  Plan of Care Certification Period: 7/18/23  Visit # / Visits authorized: 34 (33/ 40)  Re-assessment: due 7/21/2023  FOTO: d/c     PTA Visit #: 3/ 5    Precautions: Standard    Time In: 12:13 pm   Time Out: 1:00 pm   Total Billable Time: 47 minutes    SUBJECTIVE   Pt reports: Tightness is not as bad today    She was compliant with home exercise program.  Response to previous treatment: "It was alright"  Functional change: improved gait pattern     Pain: 0/10  Location: L knee    OBJECTIVE     7/7/2023:  L Knee ROM: 1 to 115 deg     7/5/2023:  L Knee ROM: 1 to 114 deg       TREATMENT     JEN received the bolded treatments listed below:      Jen received therapeutic exercises for 23 minutes for improved strength and AROM including:  Recumbent bike 6'  to increase ROM (seat at 7), level 3   Stair flex stretch 2 min   LAQ 3# x 20  Gastroc stretch on incline x 90"  Hamstring stretch 3 x 30"  Soleus stretch on incline x 90"  Treadmill backwards walking TKE .8 mph for 5 min   Prone knee hang 3# x 5'  Standing TKEs against ball 20 x 5"  Prone quad stretch x 5'  10" holds  Heel slides x 5'  10" holds  Prone TKE 30x 5 second holds   Prone hamstring curls RTB x 20  QS w/ strap around feet and over pressure 2 x 10 3 sec hold   Heel prop stretch with 5# above and below knee x5 minutes  Hamstring/gastroc stretch long " "sitting with 1/2 foam 3x 30 seconds   Double to single leg heel raise on left x30  Seated hamstring curls RTB 2 x 10 reps     Jen participated in dynamic functional therapeutic activities to improve functional performance for 00 minutes, including:    Forward step ups 20 x reps with 6" step with black monster band TKE  Lateral step downs 3 x 10 reps 4" step  Heel walking 60 ft x 2   Goblet squats 2 x 10 reps with 3#, black monster band L knee  Lateral stepping with GTB 4 x 30 ft  Hesitation march 4 x 30 with 5#   Shuttle DL with 3 black/1 red band 2 x 10 reps  Shuttle SL 1 black/1 red band 3 x 10  L only     Jen received the following manual therapy techniques: Joint mobilizations were applied to the: L knee for 24 minutes, including:  +IASTM scraping L knee  +IASTM [employer] L quad and hamstring  Grades II, III, and IV tibial glides to increase flexion in sitting  Tibial rotation with flex/ext  Overpressure into extension/flex  Superior and inferior patellar mobs  TF AP glides grade III-IV    Tibial MR/ER gr 2-3 oscill with knee flex/ext  Seated knee flexion stretch      JEN received cold pack for 00 minutes to left knee post treatment with heel prop stretch.     PATIENT EDUCATION AND HOME EXERCISES     Home Exercises Provided and Patient Education Provided     Education provided:   - Increasing flexion with HEP    Written Home Exercises Provided: Patient instructed to cont prior HEP. Exercises were reviewed and JEN was able to demonstrate them prior to the end of the session.  JEN demonstrated good  understanding of the education provided. See EMR under Patient Instructions for exercises provided during therapy sessions    ASSESSMENT   Knee flexion improved by one degree with significant overpressure applied by therapist. Today's treatment focused on improving knee flexion. Implemented several manual modalities to help ease discomfort felt with knee flexion.    JEN Is progressing well towards her " goals.   Pt prognosis is Good.     Pt will continue to benefit from skilled outpatient physical therapy to address the deficits listed in the problem list box on initial evaluation, provide pt/family education and to maximize pt's level of independence in the home and community environment.     Pt's spiritual, cultural and educational needs considered and pt agreeable to plan of care and goals.     Anticipated barriers to physical therapy: None      Previous Short Term Goals Status:     Short Term Goals: 4 weeks   Independent with HEP. (Ongoing)  Report decreased L knee pain < or =  2/10 with adls such as bending, extension, prolonged sitting, increased walking distances, and negotiating steps. (met)  Increased L knee AROM flex to 110 deg. MET   I household ambulation.  MET     Long Term Goals: 8 weeks   1  Increased L knee AROM 0 to 120 deg. Progressing, not met  2 I community ambulation with good knee ext   MET  3 I negotiating stairs with alternating step pattern  MET  4 Report decreased L knee pain < or =  1/10 with adls such as bending, extension, prolonged sitting, increased walking distances, and negotiating steps.  MET  5 Increased MMT for B LE to 4/5 muscle grade to promote proper pelvic stability to decrease L knee pain < or =  1/10 with adls such as bending, extension, prolonged sitting, increased walking distances, and negotiating steps. MET  6 Increased flexibility in B hamstrings to -20 deg with 90/90 test to promote proper pelvic stability to decrease L knee pain < or =  1/10 with adls such as bending, extension, prolonged sitting, increased walking distances, and negotiating steps. MET  7 Patient to achieve CJ (at least 20% < 40% impaired, limited or restricted) level on the [employer] Outcomes Measurement System.  MET   8  . Knee ext to 0 rom Progressing, not met    PLAN   Cont to progress towards goals set by PT.  Work to increase L knee ROM and quad strength next visit.       Delos Bailey III, PTA

## 2023-07-07 ENCOUNTER — CLINICAL SUPPORT (OUTPATIENT)
Dept: REHABILITATION | Facility: OTHER | Age: 63
End: 2023-07-07
Payer: COMMERCIAL

## 2023-07-07 DIAGNOSIS — Z74.09 IMPAIRED FUNCTIONAL MOBILITY, BALANCE, GAIT, AND ENDURANCE: ICD-10-CM

## 2023-07-07 DIAGNOSIS — M25.662 DECREASED RANGE OF MOTION OF LEFT KNEE: Primary | ICD-10-CM

## 2023-07-07 DIAGNOSIS — M62.81 MUSCLE WEAKNESS OF LOWER EXTREMITY: ICD-10-CM

## 2023-07-07 PROCEDURE — 97140 MANUAL THERAPY 1/> REGIONS: CPT | Mod: PN,CQ

## 2023-07-07 PROCEDURE — 97110 THERAPEUTIC EXERCISES: CPT | Mod: PN,CQ

## 2023-07-10 ENCOUNTER — CLINICAL SUPPORT (OUTPATIENT)
Dept: REHABILITATION | Facility: OTHER | Age: 63
End: 2023-07-10
Payer: COMMERCIAL

## 2023-07-10 DIAGNOSIS — M25.662 DECREASED RANGE OF MOTION OF LEFT KNEE: Primary | ICD-10-CM

## 2023-07-10 DIAGNOSIS — Z74.09 IMPAIRED FUNCTIONAL MOBILITY, BALANCE, GAIT, AND ENDURANCE: ICD-10-CM

## 2023-07-10 DIAGNOSIS — M62.81 MUSCLE WEAKNESS OF LOWER EXTREMITY: ICD-10-CM

## 2023-07-10 PROCEDURE — 97110 THERAPEUTIC EXERCISES: CPT | Mod: PN

## 2023-07-10 PROCEDURE — 97530 THERAPEUTIC ACTIVITIES: CPT | Mod: PN

## 2023-07-10 NOTE — PROGRESS NOTES
"[facility]   Physical Therapy Treatment Note     Name: Jen Alarcon    Clinic Number: [id number]    Therapy Diagnosis:   Encounter Diagnoses   Name Primary?    Decreased range of motion of left knee Yes    Muscle weakness of lower extremity     Impaired functional mobility, balance, gait, and endurance      Physician: Art Yuan MD    Visit Date: 7/10/2023    Physician Orders: PT Eval and Treat   Medical Diagnosis: Z96.652 (ICD-10-CM) - History of total knee replacement, left  Evaluation Date: 3/28/2023  Surgery Date: 3/13/2023  Authorization Period Expiration: 12/31/2023  Plan of Care Certification Period: 7/18/23  Visit # / Visits authorized: 35 (34/ 40)  Re-assessment: due 7/21/2023  FOTO: d/c     PTA Visit #: 0/ 5    Precautions: Standard    Time In: 2:33 pm    Time Out: 3:18 pm   Total Billable Time: 45 minutes    SUBJECTIVE   Pt reports: she was really sore after last session.  It was rough and she had to take it really easy the next day. She is feeling pretty good today.     She was compliant with home exercise program.  Response to previous treatment: "It was alright"  Functional change: improved gait pattern     Pain: 0/10  Location: L knee    OBJECTIVE     7/7/2023:  L Knee ROM: 1 to 115 deg     7/5/2023:  L Knee ROM: 1 to 114 deg       TREATMENT     JEN received the bolded treatments listed below:      Jen received therapeutic exercises for 21 minutes for improved strength and AROM including:  Recumbent bike 6'  to increase ROM (seat at 7), level 3   Prone quad stretch x 5'  10" holds  Heel slides x 5'  10" holds  Stair flex stretch 2 min   LAQ 3# x 20  Gastroc stretch on incline x 90"  Hamstring stretch 3 x 30"  Soleus stretch on incline x 90"  Treadmill backwards walking TKE .8 mph for 5 min   Prone knee hang 3# x 5'  Standing TKEs against ball 20 x 5"  Prone TKE 30x 5 second holds   Prone hamstring curls RTB x 20  QS w/ strap around feet and over pressure 2 x 10 " "3 sec hold   Heel prop stretch with 5# above and below knee x5 minutes    Hamstring/gastroc stretch long sitting with 1/2 foam 3x 30 seconds   Double to single leg heel raise on left x30  Seated hamstring curls RTB 2 x 10 reps     Jen participated in dynamic functional therapeutic activities to improve functional performance for 24 minutes, including:  Education regarding outcomes, functional activities and progress  Stairs and car mobility x10   Heel taps on 4' step with left x20 and fingertip assist  SL squat x20 on left; with TRX x10   Forward step ups 20 x reps with 6" step with black monster band TKE  Lateral step downs 3 x 10 reps 4" step  Heel walking 60 ft x 2   Goblet squats 2 x 10 reps with 3#, black monster band L knee  Lateral stepping with GTB 4 x 30 ft  Hesitation march 4 x 30 with 5#   Shuttle DL with 3 black/1 red band 2 x 10 reps  Shuttle SL 1 black/1 red band 3 x 10  L only     Jen received the following manual therapy techniques: Joint mobilizations were applied to the: L knee for 00 minutes, including:  +IASTM scraping L knee  +IASTM Hypervolt L quad and hamstring  Grades II, III, and IV tibial glides to increase flexion in sitting  Tibial rotation with flex/ext  Overpressure into extension/flex  Superior and inferior patellar mobs  TF AP glides grade III-IV    Tibial MR/ER gr 2-3 oscill with knee flex/ext  Seated knee flexion stretch      JEN received cold pack for 00 minutes to left knee post treatment with heel prop stretch.     PATIENT EDUCATION AND HOME EXERCISES     Home Exercises Provided and Patient Education Provided     Education provided:   - Increasing flexion with HEP  - continuing to work on extension  - functional range of motion  - car mobility/transfers, stairs    Written Home Exercises Provided: Patient instructed to cont prior HEP. Exercises were reviewed and JEN was able to demonstrate them prior to the end of the session.  JEN demonstrated good  understanding of the " education provided. See EMR under Patient Instructions for exercises provided during therapy sessions    ASSESSMENT   Patient continues to demonstrate functional RANGE OF MOTION although this remains limited compared to goal range.  Heavy education regarding mobility and activity performance with stairs and car transfers with patient able to demonstrate painfree in clinic today.  Formal re-assessment to be completed at next visit.      RANDY Is progressing well towards her goals.   Pt prognosis is Good.     Pt will continue to benefit from skilled outpatient physical therapy to address the deficits listed in the problem list box on initial evaluation, provide pt/family education and to maximize pt's level of independence in the home and community environment.     Pt's spiritual, cultural and educational needs considered and pt agreeable to plan of care and goals.     Anticipated barriers to physical therapy: None      Previous Short Term Goals Status:     Short Term Goals: 4 weeks   Independent with HEP. (Ongoing)  Report decreased L knee pain < or =  2/10 with adls such as bending, extension, prolonged sitting, increased walking distances, and negotiating steps. (met)  Increased L knee AROM flex to 110 deg. MET   I household ambulation.  MET     Long Term Goals: 8 weeks   1  Increased L knee AROM 0 to 120 deg. Progressing, not met  2 I community ambulation with good knee ext   MET  3 I negotiating stairs with alternating step pattern  MET  4 Report decreased L knee pain < or =  1/10 with adls such as bending, extension, prolonged sitting, increased walking distances, and negotiating steps.  MET  5 Increased MMT for B LE to 4/5 muscle grade to promote proper pelvic stability to decrease L knee pain < or =  1/10 with adls such as bending, extension, prolonged sitting, increased walking distances, and negotiating steps. MET  6 Increased flexibility in B hamstrings to -20 deg with 90/90 test to promote proper pelvic  stability to decrease L knee pain < or =  1/10 with adls such as bending, extension, prolonged sitting, increased walking distances, and negotiating steps. MET  7 Patient to achieve CJ (at least 20% < 40% impaired, limited or restricted) level on the [employer] Outcomes Measurement System.  MET   8  . Knee ext to 0 rom Progressing, not met    PLAN   Cont to progress towards goals set by PT.  Work to increase L knee ROM and quad strength next visit.       Gifty Salguero, PT

## 2023-07-12 ENCOUNTER — CLINICAL SUPPORT (OUTPATIENT)
Dept: REHABILITATION | Facility: OTHER | Age: 63
End: 2023-07-12
Payer: COMMERCIAL

## 2023-07-12 DIAGNOSIS — Z74.09 IMPAIRED FUNCTIONAL MOBILITY, BALANCE, GAIT, AND ENDURANCE: ICD-10-CM

## 2023-07-12 DIAGNOSIS — M25.662 DECREASED RANGE OF MOTION OF LEFT KNEE: Primary | ICD-10-CM

## 2023-07-12 DIAGNOSIS — M62.81 MUSCLE WEAKNESS OF LOWER EXTREMITY: ICD-10-CM

## 2023-07-12 PROCEDURE — 97530 THERAPEUTIC ACTIVITIES: CPT | Mod: PN

## 2023-07-12 PROCEDURE — 97140 MANUAL THERAPY 1/> REGIONS: CPT | Mod: PN

## 2023-07-12 NOTE — PROGRESS NOTES
"[facility]   Physical Therapy Treatment Note     Name: Jen Alarcon    Clinic Number: [id number]    Therapy Diagnosis:   Encounter Diagnoses   Name Primary?    Decreased range of motion of left knee Yes    Muscle weakness of lower extremity     Impaired functional mobility, balance, gait, and endurance        Physician: Art Yuan MD    Visit Date: 2023    Physician Orders: PT Eval and Treat   Medical Diagnosis: Z96.652 (ICD-10-CM) - History of total knee replacement, left  Evaluation Date: 3/28/2023  Surgery Date: 3/13/2023  Authorization Period Expiration: 2023  Plan of Care Certification Period: 23  Visit # / Visits authorized: 36 (35/ 40)  Re-assessment: due 2023  FOTO: d/c     PTA Visit #: 0/ 5    Precautions: Standard    Time In: 2:32 pm     Time Out: 3:17 pm    Total Billable Time: 45 minutes    SUBJECTIVE   Pt reports: she is feeling pretty good, more confident and comfortable with her progress after last visit and education regarding the natural progression of the post-op healing process.      She was compliant with home exercise program.  Response to previous treatment: "It was alright"  Functional change: improved gait pattern     Pain: 0/10  Location: L knee    OBJECTIVE     Knee Active RANGE OF MOTION (supine): 0 - 105 degrees  Knee AAROM (supine): 0-115 degrees    MMT:  Left hip flexion: 5/5  Left hip ABD: 5/5  Left hip ER: 5/5  Left hip IR: 4/5  Left hip Ext: 4/5  Left hip ADD: 5/5  Left knee flexion: 5/5  Left knee extension: 5/5  Left ankle DF: 5/5  Left ankle PF: 4/5 (increased knee flexion and anterior weight shifting)    5x sit<>stand: 7 seconds    Normal:   60-69: 11.4 seconds  70-79: 12.6 seconds  80-89: 12.7 seconds    30 seconds sit<>stand: 20 reps    Normal:   Age Male Female   60-64 17 15   65-69 16 15   70-74 15 14   75-79 14 13   80-84 13 12   85-89 11 11   90-94 9 9      TU seconds      Normal:  Community dwelling adult: >13.5 " "seconds  Older stroke patients: >14 seconds  Older adults already attending falls clinic: >15 seconds  Frail Elderly: >32.6 seconds  LE amputees >19 seconds  Parkinson's disease: >11.5 seconds  Hip OA: >10 seconds  Vestibular disorders: >11.1 seconds    (Reference: https://www.[email or web address].org/rehabilitation-measures/timed-and-go#older-adults-and-geriatric-care)     TREATMENT     JEN received the bolded treatments listed below:      Jen received therapeutic exercises for 21 minutes for improved strength and AROM including:  Recumbent bike 6'  to increase ROM (seat at 7), level 3   Prone quad stretch x 5'  10" holds  Heel slides x 5'  10" holds  Stair flex stretch 2 min   LAQ 3# x 20  Gastroc stretch on incline x 90"  Hamstring stretch 3 x 30"  Soleus stretch on incline x 90"  Treadmill backwards walking TKE .8 mph for 5 min   Prone knee hang 3# x 5'  Standing TKEs against ball 20 x 5"  Prone TKE 30x 5 second holds   Prone hamstring curls RTB x 20  QS w/ strap around feet and over pressure 2 x 10 3 sec hold   Heel prop stretch with 5# above and below knee x5 minutes    Hamstring/gastroc stretch long sitting with 1/2 foam 3x 30 seconds   Double to single leg heel raise on left x30  Seated hamstring curls RTB 2 x 10 reps     Jen participated in dynamic functional therapeutic activities to improve functional performance for 25 minutes, including:  Education regarding outcomes, functional activities and progress  Re-assessment  Stairs and car mobility x10   Heel taps on 4' step with left x20 and fingertip assist  SL squat x20 on left; with TRX x10   Forward step ups 20 x reps with 6" step with black monster band TKE  Lateral step downs 3 x 10 reps 4" step  Heel walking 60 ft x 2   Goblet squats 2 x 10 reps with 3#, black monster band L knee  Lateral stepping with GTB 4 x 30 ft  Hesitation march 4 x 30 with 5#   Shuttle DL with 3 black/1 red band 2 x 10 reps  Shuttle SL 1 black/1 red band 3 x 10  L only     Jen received " the following manual therapy techniques: Joint mobilizations were applied to the: L knee for 20 minutes, including:  IASTM scraping L knee  +IASTM Hypervolt L quad and hamstring  Grades II, III, and IV tibial glides to increase flexion in sitting  Tibial rotation with flex/ext  Overpressure into extension/flex  Superior and inferior patellar mobs  Tibial glide on femur A/P and P/A grade III-IV  Femoral glide on tibia A/P and P/A grade III-IV  Anterior tibial tilt for flexion past 100 degrees    Tibial MR/ER gr 2-3 oscill with knee flex/ext  Seated knee flexion stretch      RANDY received cold pack for 00 minutes to left knee post treatment with heel prop stretch.     PATIENT EDUCATION AND HOME EXERCISES     Home Exercises Provided and Patient Education Provided     Education provided:   - Increasing flexion with HEP  - continuing to work on extension  - functional range of motion  - car mobility/transfers, stairs  - natural progression of healing process with post-op status, swelling, range and pain/discomfort    Written Home Exercises Provided: Patient instructed to cont prior HEP. Exercises were reviewed and RANDY was able to demonstrate them prior to the end of the session.  RANDY demonstrated good  understanding of the education provided. See EMR under Patient Instructions for exercises provided during therapy sessions    ASSESSMENT   Patient is able to achieve full extension by the end of the treatment session today with manual work and positioning in supine compared to seated.  Reduced discomfort with AAROM and AROM post manual and 5 degree improvement in RANGE OF MOTION.  Continues to have limited but functional flexion AAROM and AROM.  Patient falls within normal ranges for functional testing including TUG and sit<>stands without pain.  Consider d/c at next visit pending follow up with MD.      RANDY Is progressing well towards her goals.   Pt prognosis is Good.     Pt will continue to benefit from skilled  outpatient physical therapy to address the deficits listed in the problem list box on initial evaluation, provide pt/family education and to maximize pt's level of independence in the home and community environment.     Pt's spiritual, cultural and educational needs considered and pt agreeable to plan of care and goals.     Anticipated barriers to physical therapy: None      Previous Short Term Goals Status:     Short Term Goals: 4 weeks   Independent with HEP. MET   Report decreased L knee pain < or =  2/10 with adls such as bending, extension, prolonged sitting, increased walking distances, and negotiating steps. (met)  Increased L knee AROM flex to 110 deg. MET   I household ambulation.  MET     Long Term Goals: 8 weeks   1  Increased L knee AROM 0 to 120 deg. In progress   2 I community ambulation with good knee ext   MET  3 I negotiating stairs with alternating step pattern  MET  4 Report decreased L knee pain < or =  1/10 with adls such as bending, extension, prolonged sitting, increased walking distances, and negotiating steps.  MET  5 Increased MMT for B LE to 4/5 muscle grade to promote proper pelvic stability to decrease L knee pain < or =  1/10 with adls such as bending, extension, prolonged sitting, increased walking distances, and negotiating steps. MET  6 Increased flexibility in B hamstrings to -20 deg with 90/90 test to promote proper pelvic stability to decrease L knee pain < or =  1/10 with adls such as bending, extension, prolonged sitting, increased walking distances, and negotiating steps. MET  7 Patient to achieve CJ (at least 20% < 40% impaired, limited or restricted) level on the [employer] Outcomes Measurement System.  MET   8  . Knee ext to 0 rom MET     PLAN   Cont to progress towards goals set by PT.  Work to increase L knee ROM and quad strength next visit.       Gifty Salguero, PT

## 2023-07-14 ENCOUNTER — CLINICAL SUPPORT (OUTPATIENT)
Dept: REHABILITATION | Facility: OTHER | Age: 63
End: 2023-07-14
Payer: COMMERCIAL

## 2023-07-14 DIAGNOSIS — M62.81 MUSCLE WEAKNESS OF LOWER EXTREMITY: ICD-10-CM

## 2023-07-14 DIAGNOSIS — M25.662 DECREASED RANGE OF MOTION OF LEFT KNEE: Primary | ICD-10-CM

## 2023-07-14 DIAGNOSIS — Z74.09 IMPAIRED FUNCTIONAL MOBILITY, BALANCE, GAIT, AND ENDURANCE: ICD-10-CM

## 2023-07-14 PROCEDURE — 97530 THERAPEUTIC ACTIVITIES: CPT | Mod: PN,CQ

## 2023-07-14 PROCEDURE — 97110 THERAPEUTIC EXERCISES: CPT | Mod: PN,CQ

## 2023-07-14 PROCEDURE — 97140 MANUAL THERAPY 1/> REGIONS: CPT | Mod: PN,CQ

## 2023-07-14 NOTE — PROGRESS NOTES
"[facility]   Physical Therapy Treatment Note     Name: Jen Alarcon    Clinic Number: [id number]    Therapy Diagnosis:   Encounter Diagnoses   Name Primary?    Decreased range of motion of left knee Yes    Muscle weakness of lower extremity     Impaired functional mobility, balance, gait, and endurance        Physician: Art Yuan MD    Visit Date: 2023    Physician Orders: PT Eval and Treat   Medical Diagnosis: Z96.652 (ICD-10-CM) - History of total knee replacement, left  Evaluation Date: 3/28/2023  Surgery Date: 3/13/2023  Authorization Period Expiration: 2023  Plan of Care Certification Period: 23  Visit # / Visits authorized: 37 (36/ 40)  Re-assessment: due 2023  FOTO: d/c     PTA Visit #:     Precautions: Standard    Time In: 12:49 pm     Time Out: 1:28 pm    Total Billable Time: 38 minutes    SUBJECTIVE   Pt reports: "After Gifty scraped my knee, I haven't had that pain on the inside of my knee"    She was compliant with home exercise program.  Response to previous treatment: "Good, no pain"  Functional change: improved gait pattern     Pain: 0/10  Location: L knee    OBJECTIVE     Knee Active RANGE OF MOTION (supine): 0 - 105 degrees  Knee AAROM (supine): 0-115 degrees    MMT:  Left hip flexion: 5/5  Left hip ABD: 5/5  Left hip ER: 5/5  Left hip IR: 4/5  Left hip Ext: 4/5  Left hip ADD: 5/5  Left knee flexion: 5/5  Left knee extension: 5/5  Left ankle DF: 5/5  Left ankle PF: 4/5 (increased knee flexion and anterior weight shifting)    5x sit<>stand: 7 seconds    Normal:   60-69: 11.4 seconds  70-79: 12.6 seconds  80-89: 12.7 seconds    30 seconds sit<>stand: 20 reps    Normal:   Age Male Female   60-64 17 15   65-69 16 15   70-74 15 14   75-79 14 13   80-84 13 12   85-89 11 11   90-94 9 9      TU seconds      Normal:  Community dwelling adult: >13.5 seconds  Older stroke patients: >14 seconds  Older adults already attending falls clinic: >15 " "seconds  Frail Elderly: >32.6 seconds  LE amputees >19 seconds  Parkinson's disease: >11.5 seconds  Hip OA: >10 seconds  Vestibular disorders: >11.1 seconds    (Reference: https://www.sralab.org/rehabilitation-measures/timed-and-go#older-adults-and-geriatric-care)     TREATMENT     JEN received the bolded treatments listed below:      Jen received therapeutic exercises for 8 minutes for improved strength and AROM including:  Recumbent bike 8'  to increase ROM (seat at 7), level 3   Prone quad stretch x 5'  10" holds  Heel slides x 5'  10" holds  Stair flex stretch 2 min   LAQ 3# x 20  Gastroc stretch on incline x 90"  Hamstring stretch 3 x 30"  Soleus stretch on incline x 90"  Treadmill backwards walking TKE .8 mph for 5 min   Prone knee hang 3# x 5'  Standing TKEs against ball 20 x 5"  Prone TKE 30x 5 second holds   Prone hamstring curls RTB x 20  QS w/ strap around feet and over pressure 2 x 10 3 sec hold   Heel prop stretch with 5# above and below knee x5 minutes    Hamstring/gastroc stretch long sitting with 1/2 foam 3x 30 seconds   Double to single leg heel raise on left x30  Seated hamstring curls RTB 2 x 10 reps     Jen participated in dynamic functional therapeutic activities to improve functional performance for 15 minutes, including:  Education regarding functional activities and progress  Reviewed HEP for discharge   Stairs and car mobility x10   Heel taps on 4' step with left x20 and fingertip assist  SL squat x20 on left; with TRX x10   Forward step ups 20 x reps with 6" step with black monster band TKE  Lateral step downs 3 x 10 reps 4" step  Heel walking 60 ft x 2   Goblet squats 2 x 10 reps with 3#, black monster band L knee  Lateral stepping with GTB 4 x 30 ft  Hesitation march 4 x 30 with 5#   Shuttle DL with 3 black/1 red band 2 x 10 reps  Shuttle SL 1 black/1 red band 3 x 10  L only     Jen received the following manual therapy techniques: Joint mobilizations were applied to the: L knee " for 15 minutes, including:  IASTM scraping L knee  +IASTM Hypervolt L quad and hamstring  Grades II, III, and IV tibial glides to increase flexion   Tibial rotation with flex/ext  Overpressure into extension/flex  Superior and inferior patellar mobs  Tibial glide on femur A/P and P/A grade III-IV  Femoral glide on tibia A/P and P/A grade III-IV  Anterior tibial tilt for flexion past 100 degrees    Tibial MR/ER gr 2-3 oscill with knee flex/ext  Seated knee flexion stretch      RANDY received cold pack for 00 minutes to left knee post treatment with heel prop stretch.     PATIENT EDUCATION AND HOME EXERCISES     Home Exercises Provided and Patient Education Provided     Education provided:   - Continuance of HEP after discharge    Written Home Exercises Provided: Patient instructed to cont prior HEP. Exercises were reviewed and RANDY was able to demonstrate them prior to the end of the session.  RANDY demonstrated good  understanding of the education provided. See EMR under Patient Instructions for exercises provided during therapy sessions    ASSESSMENT   Pt tolerated today's treatment well with no complaints of pain. Spent a good amount of time reviewing HEP and education on continuance of stretches multiple times a day at home. Pt prepared for discharge.    RANDY Is progressing well towards her goals.   Pt prognosis is Good.     Pt will continue to benefit from skilled outpatient physical therapy to address the deficits listed in the problem list box on initial evaluation, provide pt/family education and to maximize pt's level of independence in the home and community environment.     Pt's spiritual, cultural and educational needs considered and pt agreeable to plan of care and goals.     Anticipated barriers to physical therapy: None      Previous Short Term Goals Status:     Short Term Goals: 4 weeks   Independent with HEP. MET   Report decreased L knee pain < or =  2/10 with adls such as bending, extension,  prolonged sitting, increased walking distances, and negotiating steps. (met)  Increased L knee AROM flex to 110 deg. MET   I household ambulation.  MET     Long Term Goals: 8 weeks   1  Increased L knee AROM 0 to 120 deg. In progress   2 I community ambulation with good knee ext   MET  3 I negotiating stairs with alternating step pattern  MET  4 Report decreased L knee pain < or =  1/10 with adls such as bending, extension, prolonged sitting, increased walking distances, and negotiating steps.  MET  5 Increased MMT for B LE to 4/5 muscle grade to promote proper pelvic stability to decrease L knee pain < or =  1/10 with adls such as bending, extension, prolonged sitting, increased walking distances, and negotiating steps. MET  6 Increased flexibility in B hamstrings to -20 deg with 90/90 test to promote proper pelvic stability to decrease L knee pain < or =  1/10 with adls such as bending, extension, prolonged sitting, increased walking distances, and negotiating steps. MET  7 Patient to achieve CJ (at least 20% < 40% impaired, limited or restricted) level on the [employer] Outcomes Measurement System.  MET   8  . Knee ext to 0 rom MET     PLAN   Pt discharged from physical therapy     Rosmery Bailey III, PTA

## 2024-01-10 ENCOUNTER — OFFICE VISIT (OUTPATIENT)
Dept: OBSTETRICS AND GYNECOLOGY | Facility: CLINIC | Age: 64
End: 2024-01-10
Payer: COMMERCIAL

## 2024-01-10 VITALS
DIASTOLIC BLOOD PRESSURE: 75 MMHG | WEIGHT: 154.13 LBS | SYSTOLIC BLOOD PRESSURE: 125 MMHG | BODY MASS INDEX: 28.19 KG/M2

## 2024-01-10 DIAGNOSIS — Z12.31 VISIT FOR SCREENING MAMMOGRAM: ICD-10-CM

## 2024-01-10 DIAGNOSIS — Z01.419 WELL WOMAN EXAM WITH ROUTINE GYNECOLOGICAL EXAM: Primary | ICD-10-CM

## 2024-01-10 PROCEDURE — 3008F BODY MASS INDEX DOCD: CPT | Mod: CPTII,S$GLB,, | Performed by: NURSE PRACTITIONER

## 2024-01-10 PROCEDURE — 3074F SYST BP LT 130 MM HG: CPT | Mod: CPTII,S$GLB,, | Performed by: NURSE PRACTITIONER

## 2024-01-10 PROCEDURE — 3078F DIAST BP <80 MM HG: CPT | Mod: CPTII,S$GLB,, | Performed by: NURSE PRACTITIONER

## 2024-01-10 PROCEDURE — 1159F MED LIST DOCD IN RCRD: CPT | Mod: CPTII,S$GLB,, | Performed by: NURSE PRACTITIONER

## 2024-01-10 PROCEDURE — 99396 PREV VISIT EST AGE 40-64: CPT | Mod: S$GLB,,, | Performed by: NURSE PRACTITIONER

## 2024-01-10 PROCEDURE — 99999 PR PBB SHADOW E&M-EST. PATIENT-LVL III: CPT | Mod: PBBFAC,,, | Performed by: NURSE PRACTITIONER

## 2024-01-10 NOTE — PROGRESS NOTES
CC: Annual  HPI: Pt is a 63 y.o.  female who presents for routine annual exam. She is , not on HRT. No issues. Pap done last year. Riding in [place] this year again.    Notes from 2022-  CC: Annual  HPI: Pt is a 62 y.o.  female who presents for routine annual exam. New pt- gets primary care at , old gyn Dr. Michaels retired. She is postmenopausal. She is not on HRT. She is not sexually active. [place]. Seeing pcp tomorrow- HTN, on medication. Mother had breast cancer in her 40s. She works as an - goes into the office.      FH:   Breast cancer: mother- dx in her 40s  Colon cancer: none  Ovarian cancer: none  Uterine cancer: none     HPV vaccine: na  Last pap smear: 2022 nilm, hpv neg  History of abnormal pap smears: no     Colonoscopy: 2019, repeat in 10 years  DEXA: 10/2021  Mammogram: due in Feb 2024  STD history: no  Birth control:   Tobacco use:  no  Diabetes, HTN, CKD  Hx of fibroids and myomectomy x 3     ROS:  GENERAL: Feeling well overall. Denies fever or chills.   SKIN: Denies rash or lesions.   HEAD: Denies head injury or headache.   NODES: Denies enlarged lymph nodes.   CHEST: Denies chest pain or shortness of breath.   CARDIOVASCULAR: Denies palpitations or left sided chest pain.   ABDOMEN: No abdominal pain, constipation, diarrhea, nausea, vomiting or rectal bleeding.   URINARY: No dysuria, hematuria, or burning on urination.  REPRODUCTIVE: See HPI.   BREASTS: Denies pain, lumps, or nipple discharge.   HEMATOLOGIC: No easy bruisability or excessive bleeding.   MUSCULOSKELETAL: Denies joint pain or swelling.   NEUROLOGIC: Denies syncope or weakness.   PSYCHIATRIC: Denies depression, anxiety or mood swings.    PE:   APPEARANCE: Well nourished, well developed, Black or  female in no acute distress.  NODES: no cervical, supraclavicular, or inguinal lymphadenopathy  BREASTS: Symmetrical, no skin changes or visible lesions. No palpable masses,  nipple discharge or adenopathy bilaterally.  ABDOMEN: Soft. No tenderness or masses. No distention. No hernias palpated. No CVA tenderness.  VULVA: No lesions. Normal external female genitalia.  URETHRAL MEATUS: Normal size and location, no lesions, no prolapse.  URETHRA: No masses, tenderness, or prolapse.  VAGINA: atrophic No lesions or lacerations noted. No abnormal discharge present. No odor present.   CERVIX: No lesions or discharge. No cervical motion tenderness.   UTERUS: Normal size, regular shape, mobile, non-tender.  ADNEXA: No tenderness. No fullness or masses palpated in the adnexal regions.   ANUS PERINEUM: Normal.      Diagnosis:  1. Well woman exam with routine gynecological exam    2. Visit for screening mammogram        Plan:     Orders Placed This Encounter    Mammo Digital Screening Bilat w/ Michael     Schedule mammogram  Pap current, will repeat next year and if negative discontinue screenings  DXA current  Cscopy current    Patient was counseled today on the new ACS guidelines for cervical cytology screening as well as the current recommendations for breast cancer screening. She was counseled to follow up with her PCP for other routine health maintenance. Counseling session lasted approximately 10 minutes, and all her questions were answered.  For women over the age of 65, you can stop having cervical cancer screenings if you have never had abnormal cervical cells or cervical cancer, and youve had three negative Pap tests in a row. (You also can stop screening if youve had two negative Pap and HPV tests in a row in the past 10 years, with at least one test in the past 5 years.),    Follow-up with me in 1 year for routine exam; pap in 1 years.     I spent a total of 20 minutes on the day of the visit.This includes face to face time and non-face to face time preparing to see the patient (eg, review of tests), obtaining and/or reviewing separately obtained history, documenting clinical information  in the electronic or other health record, independently interpreting results and communicating results to the patient/family/caregiver, or care coordinator.    As of April 1, 2021, the Cures Act has been passed nationally. This new law requires that all doctors progress notes, lab results, pathology reports and radiology reports be released IMMEDIATELY to the patient in the patient portal. That means that the results are released to you at the EXACT same time they are released to me. Therefore, with all of the patients that I have I am not able to reply to each patient exactly when the results come in. So there will be a delay from when you see the results to when I see them and have time to come up with a response to send you. Also I only see these results when I am on the computer at work. So if the results come in over the weekend or after 5 pm of a work day, I will not see them until the next business day. As you can tell, this is a challenge as a provider to give every patient the quick response they hope for and deserve. So please be patient!     Thanks for your understanding and patience.

## 2024-02-05 ENCOUNTER — HOSPITAL ENCOUNTER (OUTPATIENT)
Dept: RADIOLOGY | Facility: OTHER | Age: 64
Discharge: HOME | End: 2024-02-05
Attending: NURSE PRACTITIONER
Payer: COMMERCIAL

## 2024-02-05 DIAGNOSIS — Z12.31 VISIT FOR SCREENING MAMMOGRAM: ICD-10-CM

## 2024-02-05 PROCEDURE — 77063 BREAST TOMOSYNTHESIS BI: CPT | Mod: 26,,, | Performed by: RADIOLOGY

## 2024-02-05 PROCEDURE — 77067 SCR MAMMO BI INCL CAD: CPT | Mod: TC

## 2024-02-05 PROCEDURE — 77067 SCR MAMMO BI INCL CAD: CPT | Mod: 26,,, | Performed by: RADIOLOGY

## 2025-03-17 ENCOUNTER — TELEPHONE (OUTPATIENT)
Dept: OBSTETRICS AND GYNECOLOGY | Facility: CLINIC | Age: 65
End: 2025-03-17
Payer: COMMERCIAL

## 2025-03-17 NOTE — TELEPHONE ENCOUNTER
----- Message from [employer] sent at 3/17/2025  9:25 AM CDT -----  Name of Who is Calling: RANDY GARCIA [[id number]]What is the request in detail: Pt states she needs an order for a mammogram and bone density  placed in the system. Please contact to further discuss and advise.Can the clinic reply by [facility]: YWhat Number to Call Back if not in [facility]:   [phone]

## 2025-03-18 ENCOUNTER — OFFICE VISIT (OUTPATIENT)
Dept: OBSTETRICS AND GYNECOLOGY | Facility: CLINIC | Age: 65
End: 2025-03-18
Payer: COMMERCIAL

## 2025-03-18 VITALS
BODY MASS INDEX: 27.78 KG/M2 | SYSTOLIC BLOOD PRESSURE: 144 MMHG | DIASTOLIC BLOOD PRESSURE: 74 MMHG | WEIGHT: 151.88 LBS

## 2025-03-18 DIAGNOSIS — Z13.820 OSTEOPOROSIS SCREENING: ICD-10-CM

## 2025-03-18 DIAGNOSIS — Z01.419 WELL WOMAN EXAM WITH ROUTINE GYNECOLOGICAL EXAM: Primary | ICD-10-CM

## 2025-03-18 DIAGNOSIS — Z12.4 PAP SMEAR FOR CERVICAL CANCER SCREENING: ICD-10-CM

## 2025-03-18 DIAGNOSIS — Z12.31 VISIT FOR SCREENING MAMMOGRAM: ICD-10-CM

## 2025-03-18 PROCEDURE — 3008F BODY MASS INDEX DOCD: CPT | Mod: CPTII,S$GLB,, | Performed by: NURSE PRACTITIONER

## 2025-03-18 PROCEDURE — 3288F FALL RISK ASSESSMENT DOCD: CPT | Mod: CPTII,S$GLB,, | Performed by: NURSE PRACTITIONER

## 2025-03-18 PROCEDURE — 87624 HPV HI-RISK TYP POOLED RSLT: CPT | Performed by: NURSE PRACTITIONER

## 2025-03-18 PROCEDURE — 99397 PER PM REEVAL EST PAT 65+ YR: CPT | Mod: S$GLB,,, | Performed by: NURSE PRACTITIONER

## 2025-03-18 PROCEDURE — 3078F DIAST BP <80 MM HG: CPT | Mod: CPTII,S$GLB,, | Performed by: NURSE PRACTITIONER

## 2025-03-18 PROCEDURE — 3077F SYST BP >= 140 MM HG: CPT | Mod: CPTII,S$GLB,, | Performed by: NURSE PRACTITIONER

## 2025-03-18 PROCEDURE — 1159F MED LIST DOCD IN RCRD: CPT | Mod: CPTII,S$GLB,, | Performed by: NURSE PRACTITIONER

## 2025-03-18 PROCEDURE — 1126F AMNT PAIN NOTED NONE PRSNT: CPT | Mod: CPTII,S$GLB,, | Performed by: NURSE PRACTITIONER

## 2025-03-18 PROCEDURE — 88175 CYTOPATH C/V AUTO FLUID REDO: CPT | Performed by: NURSE PRACTITIONER

## 2025-03-18 PROCEDURE — 4010F ACE/ARB THERAPY RXD/TAKEN: CPT | Mod: CPTII,S$GLB,, | Performed by: NURSE PRACTITIONER

## 2025-03-18 PROCEDURE — 1101F PT FALLS ASSESS-DOCD LE1/YR: CPT | Mod: CPTII,S$GLB,, | Performed by: NURSE PRACTITIONER

## 2025-03-18 PROCEDURE — 99999 PR PBB SHADOW E&M-EST. PATIENT-LVL III: CPT | Mod: PBBFAC,,, | Performed by: NURSE PRACTITIONER

## 2025-03-18 NOTE — PROGRESS NOTES
CC: Annual  HPI: Pt is a 65 y.o.  female who presents for routine annual exam. She is postmenopausal, not on HRT. No issues. Did [place] again this year. Had eye exam earlier today.       FH:   Breast cancer: mother- dx in her 40s  Colon cancer: none  Ovarian cancer: none  Uterine cancer: none     HPV vaccine: na  Last pap smear: 2022 nilm, hpv neg  History of abnormal pap smears: no     Colonoscopy: 2019, repeat in 10 years  DEXA: ordered  Mammogram: ordered  STD history: no  Birth control:   Tobacco use:  no  Diabetes, HTN, CKD  Hx of fibroids and myomectomy x 3    ROS:  GENERAL: Feeling well overall. Denies fever or chills.   SKIN: Denies rash or lesions.   HEAD: Denies head injury or headache.   NODES: Denies enlarged lymph nodes.   CHEST: Denies chest pain or shortness of breath.   CARDIOVASCULAR: Denies palpitations or left sided chest pain.   ABDOMEN: No abdominal pain, constipation, diarrhea, nausea, vomiting or rectal bleeding.   URINARY: No dysuria, hematuria, or burning on urination.  REPRODUCTIVE: See HPI.   BREASTS: Denies pain, lumps, or nipple discharge.   HEMATOLOGIC: No easy bruisability or excessive bleeding.   MUSCULOSKELETAL: Denies joint pain or swelling.   NEUROLOGIC: Denies syncope or weakness.   PSYCHIATRIC: Denies depression, anxiety or mood swings.    PE:   APPEARANCE: Well nourished, well developed, Black or  female in no acute distress.  NODES: no cervical, supraclavicular, or inguinal lymphadenopathy  BREASTS: Symmetrical, no skin changes or visible lesions. No palpable masses, nipple discharge or adenopathy bilaterally.  ABDOMEN: Soft. No tenderness or masses. No distention. No hernias palpated. No CVA tenderness.  VULVA: No lesions. Normal external female genitalia.  URETHRAL MEATUS: Normal size and location, no lesions, no prolapse.  URETHRA: No masses, tenderness, or prolapse.  VAGINA: Moist. No lesions or lacerations noted. No abnormal discharge present. No odor  present.   CERVIX: No lesions or discharge. No cervical motion tenderness.   UTERUS: Normal size, regular shape, mobile, non-tender.  ADNEXA: No tenderness. No fullness or masses palpated in the adnexal regions.   ANUS PERINEUM: Normal.      Diagnosis:  1. Well woman exam with routine gynecological exam    2. Visit for screening mammogram    3. Pap smear for cervical cancer screening    4. Osteoporosis screening        Plan:     Orders Placed This Encounter    HPV High Risk Genotypes, PCR    Mammo Digital Screening Bilat w/ Michael ([facility])    DXA Bone Density Axial Skeleton 1 or more sites    Liquid-Based Pap Smear, Screening     Schedule mammogram and DXA  Pap updated, if negative no longer indicated    Patient was counseled today on the new ACS guidelines for cervical cytology screening as well as the current recommendations for breast cancer screening. She was counseled to follow up with her PCP for other routine health maintenance. Counseling session lasted approximately 10 minutes, and all her questions were answered.  For women over the age of 65, you can stop having cervical cancer screenings if you have never had abnormal cervical cells or cervical cancer, and youve had three negative Pap tests in a row. (You also can stop screening if youve had two negative Pap and HPV tests in a row in the past 10 years, with at least one test in the past 5 years.),    Follow-up with me in 1 year for routine exam    I spent a total of 20 minutes on the day of the visit.This includes face to face time and non-face to face time preparing to see the patient (eg, review of tests), obtaining and/or reviewing separately obtained history, documenting clinical information in the electronic or other health record, independently interpreting results and communicating results to the patient/family/caregiver, or care coordinator.    As of April 1, 2021, the Cures Act has been passed nationally. This new law requires that all doctors  progress notes, lab results, pathology reports and radiology reports be released IMMEDIATELY to the patient in the patient portal. That means that the results are released to you at the EXACT same time they are released to me. Therefore, with all of the patients that I have I am not able to reply to each patient exactly when the results come in. So there will be a delay from when you see the results to when I see them and have time to come up with a response to send you. Also I only see these results when I am on the computer at work. So if the results come in over the weekend or after 5 pm of a work day, I will not see them until the next business day. As you can tell, this is a challenge as a provider to give every patient the quick response they hope for and deserve. So please be patient!     Thanks for your understanding and patience.

## 2025-03-21 ENCOUNTER — RESULTS FOLLOW-UP (OUTPATIENT)
Dept: OBSTETRICS AND GYNECOLOGY | Facility: CLINIC | Age: 65
End: 2025-03-21

## 2025-03-21 LAB
FINAL PATHOLOGIC DIAGNOSIS: NORMAL
HPV HR 12 DNA SPEC QL NAA+PROBE: NEGATIVE
HPV16 AG SPEC QL: NEGATIVE
HPV18 DNA SPEC QL NAA+PROBE: NEGATIVE
Lab: NORMAL

## 2025-04-01 ENCOUNTER — HOSPITAL ENCOUNTER (OUTPATIENT)
Dept: RADIOLOGY | Facility: OTHER | Age: 65
Discharge: HOME | End: 2025-04-01
Attending: NURSE PRACTITIONER
Payer: COMMERCIAL

## 2025-04-01 DIAGNOSIS — Z12.31 VISIT FOR SCREENING MAMMOGRAM: ICD-10-CM

## 2025-04-01 DIAGNOSIS — Z13.820 OSTEOPOROSIS SCREENING: ICD-10-CM

## 2025-04-01 PROCEDURE — 77063 BREAST TOMOSYNTHESIS BI: CPT | Mod: 26,,, | Performed by: RADIOLOGY

## 2025-04-01 PROCEDURE — 77067 SCR MAMMO BI INCL CAD: CPT | Mod: 26,,, | Performed by: RADIOLOGY

## 2025-04-01 PROCEDURE — 77063 BREAST TOMOSYNTHESIS BI: CPT | Mod: TC

## 2025-04-01 PROCEDURE — 77080 DXA BONE DENSITY AXIAL: CPT | Mod: TC

## 2025-04-01 PROCEDURE — 77080 DXA BONE DENSITY AXIAL: CPT | Mod: 26,,, | Performed by: RADIOLOGY

## (undated) DEVICE — APPLICATOR CHLORAPREP ORN 26ML

## (undated) DEVICE — SUT STRATAFIX PDS 1 CTX 18IN

## (undated) DEVICE — BLADE SAG DUAL 18MMX1.27MMX90M

## (undated) DEVICE — GLOVE BIOGEL SKINSENSE PI 8.5

## (undated) DEVICE — GAUZE SPONGE 4X4 12PLY

## (undated) DEVICE — BLANKET HYPER ADULT 24X60IN

## (undated) DEVICE — NDL 21GA X1 1/2 REG BEVEL

## (undated) DEVICE — UNDERGLOVES BIOGEL PI SIZE 8.5

## (undated) DEVICE — BANDAGE ESMARK ELASTIC ST 6X9

## (undated) DEVICE — BNDG COFLEX FOAM LF2 ST 6X5YD

## (undated) DEVICE — SEALER BIPOLAR TISSUE 6.0

## (undated) DEVICE — STAPLER SKIN PROXIMATE WIDE

## (undated) DEVICE — PAD CAST SPECIALIST STRL 6

## (undated) DEVICE — BANDAGE ACE ELASTIC 6"

## (undated) DEVICE — DRAPE STERI INSTRUMENT 1018

## (undated) DEVICE — SUT VICRYL PLUS 2-0 CT1 18

## (undated) DEVICE — ELECTRODE REM PLYHSV RETURN 9

## (undated) DEVICE — DRAPE EXTREMITY ORTHOMAX

## (undated) DEVICE — SUT ETHIBOND EXCEL 2 V37 30

## (undated) DEVICE — SOL NACL IRR 3000ML

## (undated) DEVICE — TOURNIQUET SB QC DP 34X4IN

## (undated) DEVICE — GLOVE BIOGEL SKINSENSE PI 7.5

## (undated) DEVICE — DRESSING XEROFORM FOIL PK 1X8

## (undated) DEVICE — SPONGE COTTON TRAY 4X4IN

## (undated) DEVICE — PULSAVAC ZIMMER

## (undated) DEVICE — UNDERGLOVES BIOGEL PI SIZE 8

## (undated) DEVICE — TIP YANKAUERS BULB NO VENT

## (undated) DEVICE — GLOVE BIOGEL SKINSENSE PI 7.0

## (undated) DEVICE — PAD COLD THERAPY KNEE WRAP ON

## (undated) DEVICE — MASK FLYTE HOOD PEEL AWAY

## (undated) DEVICE — SYR 0.9% NACL 10ML STERILE

## (undated) DEVICE — SOL IRR SOD CHL .9% POUR

## (undated) DEVICE — SPONGE LAP 18X18 PREWASHED

## (undated) DEVICE — ALCOHOL ISOPROPYL BLU 70% 16OZ

## (undated) DEVICE — Device

## (undated) DEVICE — CONTAINER SPEC OR STRL 4.5OZ

## (undated) DEVICE — PAD UNDERPAD 30X30

## (undated) DEVICE — UNDERGLOVES BIOGEL PI SZ 7 LF

## (undated) DEVICE — SOL NACL .9% 250ML INJ

## (undated) DEVICE — COVER HD BACK TABLE 6FT

## (undated) DEVICE — PRESSURIZER BN CEMENT NOZZLE

## (undated) DEVICE — BANDAGE MATRIX HK LOOP 6IN 5YD

## (undated) DEVICE — TOWEL OR DISP STRL BLUE 4/PK

## (undated) DEVICE — KIT TOTAL HIP OPTIVAC

## (undated) DEVICE — TRAY CATH FOL SIL URIMTR 16FR

## (undated) DEVICE — SUT VICRYL+ 1 CTX 18IN VIOL

## (undated) DEVICE — DRESSING XEROFORM NONADH 1X8IN